# Patient Record
Sex: FEMALE | Race: WHITE | Employment: FULL TIME | URBAN - NONMETROPOLITAN AREA
[De-identification: names, ages, dates, MRNs, and addresses within clinical notes are randomized per-mention and may not be internally consistent; named-entity substitution may affect disease eponyms.]

---

## 2017-01-27 ENCOUNTER — TELEPHONE (OUTPATIENT)
Dept: OBGYN | Age: 47
End: 2017-01-27

## 2018-07-14 ENCOUNTER — OFFICE VISIT (OUTPATIENT)
Dept: RETAIL CLINIC | Facility: CLINIC | Age: 48
End: 2018-07-14

## 2018-07-14 VITALS
TEMPERATURE: 98.6 F | WEIGHT: 176.6 LBS | HEART RATE: 76 BPM | HEIGHT: 60 IN | BODY MASS INDEX: 34.67 KG/M2 | OXYGEN SATURATION: 97 % | DIASTOLIC BLOOD PRESSURE: 72 MMHG | SYSTOLIC BLOOD PRESSURE: 110 MMHG | RESPIRATION RATE: 20 BRPM

## 2018-07-14 DIAGNOSIS — J06.9 ACUTE UPPER RESPIRATORY INFECTION: Primary | ICD-10-CM

## 2018-07-14 DIAGNOSIS — R05.9 COUGH: ICD-10-CM

## 2018-07-14 PROCEDURE — 99203 OFFICE O/P NEW LOW 30 MIN: CPT | Performed by: ADVANCED PRACTICE MIDWIFE

## 2018-07-14 RX ORDER — BENZONATATE 100 MG/1
200 CAPSULE ORAL 3 TIMES DAILY PRN
Qty: 30 CAPSULE | Refills: 0 | Status: SHIPPED | OUTPATIENT
Start: 2018-07-14 | End: 2020-11-09

## 2018-07-14 RX ORDER — AMOXICILLIN 500 MG/1
500 CAPSULE ORAL 2 TIMES DAILY
Qty: 20 CAPSULE | Refills: 0 | Status: SHIPPED | OUTPATIENT
Start: 2018-07-14 | End: 2018-07-24

## 2018-07-14 RX ORDER — BUSPIRONE HYDROCHLORIDE 10 MG/1
10 TABLET ORAL 3 TIMES DAILY
COMMUNITY
End: 2020-11-09

## 2018-07-14 RX ORDER — BUPROPION HYDROCHLORIDE 150 MG/1
300 TABLET ORAL DAILY
COMMUNITY

## 2018-07-14 NOTE — PROGRESS NOTES
Chief Complaint   Patient presents with   • Cough     Subjective   Una Gonsalez is a 47 y.o. female who presents to the clinic today with complaints   Cough   This is a new problem. Episode onset: 10 days ago. The problem has been unchanged. The problem occurs every few minutes. The cough is productive of sputum. Associated symptoms include rhinorrhea and a sore throat. Pertinent negatives include no chest pain, chills, ear congestion, ear pain, fever, headaches, nasal congestion, shortness of breath or wheezing. The symptoms are aggravated by lying down. She has tried OTC cough suppressant for the symptoms. The treatment provided no relief.         Current Outpatient Prescriptions:   •  buPROPion XL (WELLBUTRIN XL) 150 MG 24 hr tablet, Take 300 mg by mouth Daily., Disp: , Rfl:   •  busPIRone (BUSPAR) 10 MG tablet, Take 10 mg by mouth 3 (Three) Times a Day., Disp: , Rfl:   •  amoxicillin (AMOXIL) 500 MG capsule, Take 1 capsule by mouth 2 (Two) Times a Day for 10 days., Disp: 20 capsule, Rfl: 0  •  benzonatate (TESSALON PERLES) 100 MG capsule, Take 2 capsules by mouth 3 (Three) Times a Day As Needed for Cough., Disp: 30 capsule, Rfl: 0    Allergies:  Patient has no known allergies.    Past Medical History:   Diagnosis Date   • Anxiety      Past Surgical History:   Procedure Laterality Date   •  SECTION  ,    • HYSTERECTOMY       History reviewed. No pertinent family history.  Social History   Substance Use Topics   • Smoking status: Never Smoker   • Smokeless tobacco: Not on file   • Alcohol use No       Review of Systems  Review of Systems   Constitutional: Positive for fatigue. Negative for chills and fever.   HENT: Positive for rhinorrhea and sore throat. Negative for ear discharge, ear pain, sinus pain, sinus pressure, sneezing, tinnitus and trouble swallowing.    Respiratory: Positive for cough. Negative for shortness of breath and wheezing.    Cardiovascular: Negative for chest pain.  "  Neurological: Negative for headaches.       Objective   /72 (BP Location: Left arm, Patient Position: Sitting, Cuff Size: Adult)   Pulse 76   Temp 98.6 °F (37 °C) (Oral)   Resp 20   Ht 152.4 cm (60\")   Wt 80.1 kg (176 lb 9.6 oz)   SpO2 97%   BMI 34.49 kg/m²       Physical Exam   Constitutional: She is oriented to person, place, and time. Vital signs are normal. She appears well-developed and well-nourished. She is cooperative. She appears ill. No distress.   HENT:   Head: Normocephalic.   Right Ear: Hearing, tympanic membrane and external ear normal.   Left Ear: Hearing, tympanic membrane and external ear normal.   Nose: Rhinorrhea present. No sinus tenderness.   Mouth/Throat: Uvula is midline and mucous membranes are normal. Posterior oropharyngeal erythema (cobblestoning present) present. No oropharyngeal exudate. Tonsils are 2+ on the right. Tonsils are 2+ on the left. No tonsillar exudate.   Bilateral ear canals erythematous. Bilateral nares/turbinated erythematous.   Eyes: Pupils are equal, round, and reactive to light.   Cardiovascular: Normal rate, regular rhythm and normal heart sounds.    No murmur heard.  Pulmonary/Chest: Effort normal and breath sounds normal. No respiratory distress. She has no wheezes.   Lymphadenopathy:        Head (right side): No submental, no submandibular, no tonsillar, no preauricular and no posterior auricular adenopathy present.        Head (left side): No submental, no submandibular, no tonsillar, no preauricular and no posterior auricular adenopathy present.     She has no cervical adenopathy.   Neurological: She is alert and oriented to person, place, and time.   Skin: Skin is warm and dry.   Psychiatric: She has a normal mood and affect. Her behavior is normal.       Assessment/Plan     Una was seen today for cough.    Diagnoses and all orders for this visit:    Acute upper respiratory infection  -     amoxicillin (AMOXIL) 500 MG capsule; Take 1 capsule by " mouth 2 (Two) Times a Day for 10 days.    Cough  -     benzonatate (TESSALON PERLES) 100 MG capsule; Take 2 capsules by mouth 3 (Three) Times a Day As Needed for Cough.            See patient education instructions. Declines AVS. Take abx until all gone. If no improvement after 48 hours on abx, f/u with PCP. Discontinue OTC cough suppressant while on Tessalon. Increase water intake and rest. Verbalized understanding and agrees with POC.

## 2018-07-14 NOTE — PATIENT INSTRUCTIONS
"Upper Respiratory Infection, Adult  Most upper respiratory infections (URIs) are a viral infection of the air passages leading to the lungs. A URI affects the nose, throat, and upper air passages. The most common type of URI is nasopharyngitis and is typically referred to as \"the common cold.\"  URIs run their course and usually go away on their own. Most of the time, a URI does not require medical attention, but sometimes a bacterial infection in the upper airways can follow a viral infection. This is called a secondary infection. Sinus and middle ear infections are common types of secondary upper respiratory infections.  Bacterial pneumonia can also complicate a URI. A URI can worsen asthma and chronic obstructive pulmonary disease (COPD). Sometimes, these complications can require emergency medical care and may be life threatening.  What are the causes?  Almost all URIs are caused by viruses. A virus is a type of germ and can spread from one person to another.  What increases the risk?  You may be at risk for a URI if:  · You smoke.  · You have chronic heart or lung disease.  · You have a weakened defense (immune) system.  · You are very young or very old.  · You have nasal allergies or asthma.  · You work in crowded or poorly ventilated areas.  · You work in health care facilities or schools.    What are the signs or symptoms?  Symptoms typically develop 2-3 days after you come in contact with a cold virus. Most viral URIs last 7-10 days. However, viral URIs from the influenza virus (flu virus) can last 14-18 days and are typically more severe. Symptoms may include:  · Runny or stuffy (congested) nose.  · Sneezing.  · Cough.  · Sore throat.  · Headache.  · Fatigue.  · Fever.  · Loss of appetite.  · Pain in your forehead, behind your eyes, and over your cheekbones (sinus pain).  · Muscle aches.    How is this diagnosed?  Your health care provider may diagnose a URI by:  · Physical exam.  · Tests to check that your " symptoms are not due to another condition such as:  ? Strep throat.  ? Sinusitis.  ? Pneumonia.  ? Asthma.    How is this treated?  A URI goes away on its own with time. It cannot be cured with medicines, but medicines may be prescribed or recommended to relieve symptoms. Medicines may help:  · Reduce your fever.  · Reduce your cough.  · Relieve nasal congestion.    Follow these instructions at home:  · Take medicines only as directed by your health care provider.  · Gargle warm saltwater or take cough drops to comfort your throat as directed by your health care provider.  · Use a warm mist humidifier or inhale steam from a shower to increase air moisture. This may make it easier to breathe.  · Drink enough fluid to keep your urine clear or pale yellow.  · Eat soups and other clear broths and maintain good nutrition.  · Rest as needed.  · Return to work when your temperature has returned to normal or as your health care provider advises. You may need to stay home longer to avoid infecting others. You can also use a face mask and careful hand washing to prevent spread of the virus.  · Increase the usage of your inhaler if you have asthma.  · Do not use any tobacco products, including cigarettes, chewing tobacco, or electronic cigarettes. If you need help quitting, ask your health care provider.  How is this prevented?  The best way to protect yourself from getting a cold is to practice good hygiene.  · Avoid oral or hand contact with people with cold symptoms.  · Wash your hands often if contact occurs.    There is no clear evidence that vitamin C, vitamin E, echinacea, or exercise reduces the chance of developing a cold. However, it is always recommended to get plenty of rest, exercise, and practice good nutrition.  Contact a health care provider if:  · You are getting worse rather than better.  · Your symptoms are not controlled by medicine.  · You have chills.  · You have worsening shortness of breath.  · You have  brown or red mucus.  · You have yellow or brown nasal discharge.  · You have pain in your face, especially when you bend forward.  · You have a fever.  · You have swollen neck glands.  · You have pain while swallowing.  · You have white areas in the back of your throat.  Get help right away if:  · You have severe or persistent:  ? Headache.  ? Ear pain.  ? Sinus pain.  ? Chest pain.  · You have chronic lung disease and any of the following:  ? Wheezing.  ? Prolonged cough.  ? Coughing up blood.  ? A change in your usual mucus.  · You have a stiff neck.  · You have changes in your:  ? Vision.  ? Hearing.  ? Thinking.  ? Mood.  This information is not intended to replace advice given to you by your health care provider. Make sure you discuss any questions you have with your health care provider.  Document Released: 06/13/2002 Document Revised: 08/20/2017 Document Reviewed: 03/25/2015  [employer] Interactive Patient Education © 2018 [employer]  Amoxicillin capsules or tablets  What is this medicine?  AMOXICILLIN (a mox i DIONE in) is a penicillin antibiotic. It is used to treat certain kinds of bacterial infections. It will not work for colds, flu, or other viral infections.  This medicine may be used for other purposes; ask your health care provider or pharmacist if you have questions.  COMMON BRAND NAME(S): Amoxil, Moxilin, Sumox, Trimox  What should I tell my health care provider before I take this medicine?  They need to know if you have any of these conditions:  -asthma  -kidney disease  -an unusual or allergic reaction to amoxicillin, other penicillins, cephalosporin antibiotics, other medicines, foods, dyes, or preservatives  -pregnant or trying to get pregnant  -breast-feeding  How should I use this medicine?  Take this medicine by mouth with a glass of water. Follow the directions on your prescription label. You may take this medicine with food or on an empty stomach. Take your medicine at regular intervals. Do  not take your medicine more often than directed. Take all of your medicine as directed even if you think your are better. Do not skip doses or stop your medicine early.  Talk to your pediatrician regarding the use of this medicine in children. While this drug may be prescribed for selected conditions, precautions do apply.  Overdosage: If you think you have taken too much of this medicine contact a poison control center or emergency room at once.  NOTE: This medicine is only for you. Do not share this medicine with others.  What if I miss a dose?  If you miss a dose, take it as soon as you can. If it is almost time for your next dose, take only that dose. Do not take double or extra doses.  What may interact with this medicine?  -amiloride  -birth control pills  -chloramphenicol  -macrolides  -probenecid  -sulfonamides  -tetracyclines  This list may not describe all possible interactions. Give your health care provider a list of all the medicines, herbs, non-prescription drugs, or dietary supplements you use. Also tell them if you smoke, drink alcohol, or use illegal drugs. Some items may interact with your medicine.  What should I watch for while using this medicine?  Tell your doctor or health care professional if your symptoms do not improve in 2 or 3 days. Take all of the doses of your medicine as directed. Do not skip doses or stop your medicine early.  If you are diabetic, you may get a false positive result for sugar in your urine with certain brands of urine tests. Check with your doctor.  Do not treat diarrhea with over-the-counter products. Contact your doctor if you have diarrhea that lasts more than 2 days or if the diarrhea is severe and watery.  What side effects may I notice from receiving this medicine?  Side effects that you should report to your doctor or health care professional as soon as possible:  -allergic reactions like skin rash, itching or hives, swelling of the face, lips, or  tongue  -breathing problems  -dark urine  -redness, blistering, peeling or loosening of the skin, including inside the mouth  -seizures  -severe or watery diarrhea  -trouble passing urine or change in the amount of urine  -unusual bleeding or bruising  -unusually weak or tired  -yellowing of the eyes or skin  Side effects that usually do not require medical attention (report to your doctor or health care professional if they continue or are bothersome):  -dizziness  -headache  -stomach upset  -trouble sleeping  This list may not describe all possible side effects. Call your doctor for medical advice about side effects. You may report side effects to FDA at 4-770-FDA-1974.  Where should I keep my medicine?  Keep out of the reach of children.  Store between 68 and 77 degrees F (20 and 25 degrees C). Keep bottle closed tightly. Throw away any unused medicine after the expiration date.  NOTE: This sheet is a summary. It may not cover all possible information. If you have questions about this medicine, talk to your doctor, pharmacist, or health care provider.  © 2018 Elsevier/[employer] Standard (2009-03-10 14:10:59)  Cough, Adult  Coughing is a reflex that clears your throat and your airways. Coughing helps to heal and protect your lungs. It is normal to cough occasionally, but a cough that happens with other symptoms or lasts a long time may be a sign of a condition that needs treatment. A cough may last only 2-3 weeks (acute), or it may last longer than 8 weeks (chronic).  What are the causes?  Coughing is commonly caused by:  · Breathing in substances that irritate your lungs.  · A viral or bacterial respiratory infection.  · Allergies.  · Asthma.  · Postnasal drip.  · Smoking.  · Acid backing up from the stomach into the esophagus (gastroesophageal reflux).  · Certain medicines.  · Chronic lung problems, including COPD (or rarely, lung cancer).  · Other medical conditions such as heart failure.    Follow these  instructions at home:  Pay attention to any changes in your symptoms. Take these actions to help with your discomfort:  · Take medicines only as told by your health care provider.  ? If you were prescribed an antibiotic medicine, take it as told by your health care provider. Do not stop taking the antibiotic even if you start to feel better.  ? Talk with your health care provider before you take a cough suppressant medicine.  · Drink enough fluid to keep your urine clear or pale yellow.  · If the air is dry, use a cold steam vaporizer or humidifier in your bedroom or your home to help loosen secretions.  · Avoid anything that causes you to cough at work or at home.  · If your cough is worse at night, try sleeping in a semi-upright position.  · Avoid cigarette smoke. If you smoke, quit smoking. If you need help quitting, ask your health care provider.  · Avoid caffeine.  · Avoid alcohol.  · Rest as needed.    Contact a health care provider if:  · You have new symptoms.  · You cough up pus.  · Your cough does not get better after 2-3 weeks, or your cough gets worse.  · You cannot control your cough with suppressant medicines and you are losing sleep.  · You develop pain that is getting worse or pain that is not controlled with pain medicines.  · You have a fever.  · You have unexplained weight loss.  · You have night sweats.  Get help right away if:  · You cough up blood.  · You have difficulty breathing.  · Your heartbeat is very fast.  This information is not intended to replace advice given to you by your health care provider. Make sure you discuss any questions you have with your health care provider.  Document Released: 06/15/2012 Document Revised: 05/25/2017 Document Reviewed: 02/24/2016  [employer] Interactive Patient Education © 2018 [employer]  Benzonatate capsules  What is this medicine?  BENZONATATE (juhi SARINA na wilks) is used to treat cough.  This medicine may be used for other purposes; ask your health  care provider or pharmacist if you have questions.  COMMON BRAND NAME(S): Maricel Curry  What should I tell my health care provider before I take this medicine?  They need to know if you have any of these conditions:  -kidney or liver disease  -an unusual or allergic reaction to benzonatate, anesthetics, other medicines, foods, dyes, or preservatives  -pregnant or trying to get pregnant  -breast-feeding  How should I use this medicine?  Take this medicine by mouth with a glass of water. Follow the directions on the prescription label. Avoid breaking, chewing, or sucking the capsule, as this can cause serious side effects. Take your medicine at regular intervals. Do not take your medicine more often than directed.  Talk to your pediatrician regarding the use of this medicine in children. While this drug may be prescribed for children as young as 10 years old for selected conditions, precautions do apply.  Overdosage: If you think you have taken too much of this medicine contact a poison control center or emergency room at once.  NOTE: This medicine is only for you. Do not share this medicine with others.  What if I miss a dose?  If you miss a dose, take it as soon as you can. If it is almost time for your next dose, take only that dose. Do not take double or extra doses.  What may interact with this medicine?  Do not take this medicine with any of the following medications:  -MAOIs like Carbex, Eldepryl, Marplan, Nardil, and Parnate  This list may not describe all possible interactions. Give your health care provider a list of all the medicines, herbs, non-prescription drugs, or dietary supplements you use. Also tell them if you smoke, drink alcohol, or use illegal drugs. Some items may interact with your medicine.  What should I watch for while using this medicine?  Tell your doctor if your symptoms do not improve or if they get worse. If you have a high fever, skin rash, or headache, see your health  care professional.  You may get drowsy or dizzy. Do not drive, use machinery, or do anything that needs mental alertness until you know how this medicine affects you. Do not sit or stand up quickly, especially if you are an older patient. This reduces the risk of dizzy or fainting spells.  What side effects may I notice from receiving this medicine?  Side effects that you should report to your doctor or health care professional as soon as possible:  -allergic reactions like skin rash, itching or hives, swelling of the face, lips, or tongue  -breathing problems  -chest pain  -confusion or hallucinations  -irregular heartbeat  -numbness of mouth or throat  -seizures  Side effects that usually do not require medical attention (report to your doctor or health care professional if they continue or are bothersome):  -burning feeling in the eyes  -constipation  -headache  -nasal congestion  -stomach upset  This list may not describe all possible side effects. Call your doctor for medical advice about side effects. You may report side effects to FDA at 8-666-HIO-8230.  Where should I keep my medicine?  Keep out of the reach of children.  Store at room temperature between 15 and 30 degrees C (59 and 86 degrees F). Keep tightly closed. Protect from light and moisture. Throw away any unused medicine after the expiration date.  NOTE: This sheet is a summary. It may not cover all possible information. If you have questions about this medicine, talk to your doctor, pharmacist, or health care provider.  © 2018 [employer]vier/[employer] Standard (2009-03-18 14:52:56)

## 2018-09-20 ENCOUNTER — TELEPHONE (OUTPATIENT)
Dept: GASTROENTEROLOGY | Facility: CLINIC | Age: 48
End: 2018-09-20

## 2018-09-20 NOTE — TELEPHONE ENCOUNTER
Pt records from physician reviewed  According to medical record Una Gonsalez does not have a history of heart disease or lung disease.  According to medical record Una Gonsalez is not currently on blood thinner.  According to medical record Una Gonsalez is not currently exhibiting abdominal pain, weight loss, hematochezia, melena,  change in bowels, or other symptoms to indicate pt would need to be seen in office.    Will submit order for Una Gonsalez to proceed with [facility]    Medication will be verified as well as a lack of GI related symptomology when pt is scheduled for procedure.

## 2018-10-04 ENCOUNTER — TELEPHONE (OUTPATIENT)
Dept: GASTROENTEROLOGY | Facility: CLINIC | Age: 48
End: 2018-10-04

## 2018-10-24 ENCOUNTER — TELEPHONE (OUTPATIENT)
Dept: GASTROENTEROLOGY | Facility: CLINIC | Age: 48
End: 2018-10-24

## 2018-11-08 ENCOUNTER — TELEPHONE (OUTPATIENT)
Dept: GASTROENTEROLOGY | Facility: CLINIC | Age: 48
End: 2018-11-08

## 2019-12-19 ENCOUNTER — LAB (OUTPATIENT)
Dept: LAB | Facility: HOSPITAL | Age: 49
End: 2019-12-19

## 2019-12-19 ENCOUNTER — TRANSCRIBE ORDERS (OUTPATIENT)
Dept: ADMINISTRATIVE | Facility: HOSPITAL | Age: 49
End: 2019-12-19

## 2019-12-19 DIAGNOSIS — R30.9 MICTURITION PAINFUL: ICD-10-CM

## 2019-12-19 DIAGNOSIS — R30.9 MICTURITION PAINFUL: Primary | ICD-10-CM

## 2019-12-19 LAB
BACTERIA UR QL AUTO: ABNORMAL /HPF
BILIRUB UR QL STRIP: NEGATIVE
CLARITY UR: CLEAR
COLOR UR: YELLOW
GLUCOSE UR STRIP-MCNC: NEGATIVE MG/DL
HGB UR QL STRIP.AUTO: NEGATIVE
HYALINE CASTS UR QL AUTO: ABNORMAL /LPF
KETONES UR QL STRIP: NEGATIVE
LEUKOCYTE ESTERASE UR QL STRIP.AUTO: ABNORMAL
NITRITE UR QL STRIP: NEGATIVE
PH UR STRIP.AUTO: 6.5 [PH] (ref 5–8)
PROT UR QL STRIP: NEGATIVE
RBC # UR: ABNORMAL /HPF
REF LAB TEST METHOD: ABNORMAL
SP GR UR STRIP: 1.01 (ref 1–1.03)
SQUAMOUS #/AREA URNS HPF: ABNORMAL /HPF
UROBILINOGEN UR QL STRIP: ABNORMAL
WBC UR QL AUTO: ABNORMAL /HPF

## 2019-12-19 PROCEDURE — 87086 URINE CULTURE/COLONY COUNT: CPT | Performed by: PEDIATRICS

## 2019-12-19 PROCEDURE — 81001 URINALYSIS AUTO W/SCOPE: CPT

## 2019-12-22 LAB — BACTERIA SPEC AEROBE CULT: ABNORMAL

## 2020-01-15 ENCOUNTER — TRANSCRIBE ORDERS (OUTPATIENT)
Dept: ADMINISTRATIVE | Facility: HOSPITAL | Age: 50
End: 2020-01-15

## 2020-01-15 ENCOUNTER — APPOINTMENT (OUTPATIENT)
Dept: LAB | Facility: HOSPITAL | Age: 50
End: 2020-01-15

## 2020-01-15 DIAGNOSIS — N39.0 URINARY TRACT INFECTION WITHOUT HEMATURIA, SITE UNSPECIFIED: Primary | ICD-10-CM

## 2020-01-15 LAB
BILIRUB UR QL STRIP: NEGATIVE
CLARITY UR: CLEAR
COLOR UR: YELLOW
GLUCOSE UR STRIP-MCNC: NEGATIVE MG/DL
HGB UR QL STRIP.AUTO: NEGATIVE
KETONES UR QL STRIP: ABNORMAL
LEUKOCYTE ESTERASE UR QL STRIP.AUTO: NEGATIVE
NITRITE UR QL STRIP: NEGATIVE
PH UR STRIP.AUTO: 6 [PH] (ref 5–8)
PROT UR QL STRIP: NEGATIVE
SP GR UR STRIP: 1.01 (ref 1–1.03)
UROBILINOGEN UR QL STRIP: ABNORMAL

## 2020-01-15 PROCEDURE — 81003 URINALYSIS AUTO W/O SCOPE: CPT | Performed by: PEDIATRICS

## 2020-02-25 ENCOUNTER — APPOINTMENT (OUTPATIENT)
Dept: LAB | Facility: HOSPITAL | Age: 50
End: 2020-02-25

## 2020-02-25 ENCOUNTER — TRANSCRIBE ORDERS (OUTPATIENT)
Dept: ADMINISTRATIVE | Facility: HOSPITAL | Age: 50
End: 2020-02-25

## 2020-02-25 DIAGNOSIS — R30.9 PAINFUL MICTURITION, UNSPECIFIED: Primary | ICD-10-CM

## 2020-02-25 LAB
BACTERIA UR QL AUTO: ABNORMAL /HPF
BILIRUB UR QL STRIP: NEGATIVE
CLARITY UR: CLEAR
COLOR UR: YELLOW
GLUCOSE UR STRIP-MCNC: NEGATIVE MG/DL
HGB UR QL STRIP.AUTO: NEGATIVE
HYALINE CASTS UR QL AUTO: ABNORMAL /LPF
KETONES UR QL STRIP: ABNORMAL
LEUKOCYTE ESTERASE UR QL STRIP.AUTO: ABNORMAL
NITRITE UR QL STRIP: NEGATIVE
PH UR STRIP.AUTO: 6 [PH] (ref 5–8)
PROT UR QL STRIP: NEGATIVE
RBC # UR: ABNORMAL /HPF
REF LAB TEST METHOD: ABNORMAL
SP GR UR STRIP: 1.02 (ref 1–1.03)
SQUAMOUS #/AREA URNS HPF: ABNORMAL /HPF
UROBILINOGEN UR QL STRIP: ABNORMAL
WBC UR QL AUTO: ABNORMAL /HPF

## 2020-02-25 PROCEDURE — 87086 URINE CULTURE/COLONY COUNT: CPT | Performed by: PEDIATRICS

## 2020-02-25 PROCEDURE — 81001 URINALYSIS AUTO W/SCOPE: CPT | Performed by: PEDIATRICS

## 2020-02-26 LAB — BACTERIA SPEC AEROBE CULT: NORMAL

## 2020-11-09 ENCOUNTER — TELEMEDICINE (OUTPATIENT)
Dept: GASTROENTEROLOGY | Facility: CLINIC | Age: 50
End: 2020-11-09

## 2020-11-09 VITALS — HEIGHT: 60 IN | WEIGHT: 169 LBS | BODY MASS INDEX: 33.18 KG/M2

## 2020-11-09 DIAGNOSIS — Z12.11 ENCOUNTER FOR SCREENING FOR MALIGNANT NEOPLASM OF COLON: Primary | ICD-10-CM

## 2020-11-09 PROCEDURE — S0285 CNSLT BEFORE SCREEN COLONOSC: HCPCS | Performed by: CLINICAL NURSE SPECIALIST

## 2020-11-09 RX ORDER — SODIUM, POTASSIUM,MAG SULFATES 17.5-3.13G
SOLUTION, RECONSTITUTED, ORAL ORAL
Qty: 2 BOTTLE | Refills: 0 | Status: ON HOLD | OUTPATIENT
Start: 2020-11-09 | End: 2021-01-11

## 2020-11-09 RX ORDER — PHENTERMINE HYDROCHLORIDE 37.5 MG/1
37.5 CAPSULE ORAL EVERY MORNING
COMMUNITY

## 2020-11-09 NOTE — PROGRESS NOTES
Una Gonsalez  1970      2020  Chief Complaint   Patient presents with   • Colonoscopy     New patient ref by Dr. PRICE Sanchez     Subjective   HPI  Una Gonsalez is a 50 y.o. female who presents as a referral for preventative maintenance. She has no complaints of nausea or vomiting. No change in bowels. No wt loss. No BRBPR. No melena. There is no family hx for colon cancer. No abdominal pain.  Past Medical History:   Diagnosis Date   • Anxiety    • Obesity      Past Surgical History:   Procedure Laterality Date   •  SECTION  ,    • EYE SURGERY     • HYSTERECTOMY       Outpatient Medications Marked as Taking for the 20 encounter (Telemedicine) with Carmen Epperson APRN   Medication Sig Dispense Refill   • buPROPion XL (WELLBUTRIN XL) 150 MG 24 hr tablet Take 300 mg by mouth Daily.     • phentermine 37.5 MG capsule Take 37.5 mg by mouth Every Morning.       No Known Allergies  Social History     Socioeconomic History   • Marital status:      Spouse name: Not on file   • Number of children: Not on file   • Years of education: Not on file   • Highest education level: Not on file   Tobacco Use   • Smoking status: Never Smoker   • Smokeless tobacco: Never Used   Substance and Sexual Activity   • Alcohol use: No   • Drug use: No     Family History   Problem Relation Age of Onset   • Colon cancer Neg Hx    • Colon polyps Neg Hx      Health Maintenance   Topic Date Due   • COLONOSCOPY  1970   • ANNUAL PHYSICAL  08/15/1971   • TDAP/TD VACCINES (1 - Tdap) 08/15/1987   • HEPATITIS C SCREENING  2018   • PAP SMEAR  2018   • ZOSTER VACCINE (1 of 2) 08/15/2018   • MAMMOGRAM  2019   • INFLUENZA VACCINE  2020   • Pneumococcal Vaccine 0-64  Aged Out       REVIEW OF SYSTEMS  General: well appearing, no fever chills or sweats, no unexplained wt loss  HEENT: no acute visual or hearing disturbances  Cardiovascular: No chest pain or palpitations  Pulmonary: No  "shortness of breath, coughing, wheezing or hemoptysis  : No burning, urgency, hematuria, or dysuria  Musculoskeletal: No joint pain or stiffness  Peripheral: no edema  Skin: No lesions or rashes  Neuro: No dizziness, headaches, stroke, syncope  Endocrine: No hot or cold intolerances  Hematological: No blood dyscrasias    Objective   Vitals:    11/09/20 1339   Weight: 76.7 kg (169 lb)   Height: 152.4 cm (60\")     Body mass index is 33.01 kg/m².  Patient's Body mass index is 33.01 kg/m². BMI is above normal parameters. Recommendations include: nutrition counseling.      PHYSICAL EXAM  General: age appropriate well nourished well appearing, no acute distress  Head: normocephalic and atraumatic  Global assessment-supple  Neck-No JVD noted  Neuro-Non focal, converses appropriately, awake, alert, oriented    Assessment/Plan     Diagnoses and all orders for this visit:    1. Encounter for screening for malignant neoplasm of colon (Primary)  -     Case Request; Standing  -     Implement Anesthesia Orders Day of Procedure; Standing  -     Obtain Informed Consent; Standing  -     Verify bowel prep was successful; Standing  -     Suprep Bowel Prep Kit 17.5-3.13-1.6 GM/177ML solution oral solution; Take as directed by office instructions provided  Dispense: 2 bottle; Refill: 0  -     Case Request        COLONOSCOPY WITH ANESTHESIA (N/A)  Body mass index is 33.01 kg/m².    Patient instructions on prep prior to procedure provided to the patient.    All risks, benefits, alternatives, and indications of colonoscopy procedure have been discussed with the patient. Risks to include perforation of the colon requiring possible surgery or colostomy, risk of bleeding from biopsies or removal of colon tissue, possibility of missing a colon polyp or cancer, or adverse drug reaction.  Benefits to include the diagnosis and management of disease of the colon and rectum. Alternatives to include barium enema, radiographic evaluation, lab " testing or no intervention. Pt verbalizes understanding and agrees.     Carmen Debi Epperson, ABDIRIZAK  2020  13:50 CST      IF YOU SMOKE OR USE TOBACCO PLEASE READ THE FOLLOWIN minutes reading provided    Why is smoking bad for me?  Smoking increases the risk of heart disease, lung disease, vascular disease, stroke, and cancer.     If you smoke, STOP!    If you would like more information on quitting smoking, please visit the [employer] website: [email or web address]   This link will provide additional resources including the QUIT line and the Beat the Pack support groups.     For more information:    Quit Now [place]  -QUIT-NOW  [email or web address]    Obesity, Adult  Obesity is the condition of having too much total body fat. Being overweight or obese means that your weight is greater than what is considered healthy for your body size. Obesity is determined by a measurement called BMI. BMI is an estimate of body fat and is calculated from height and weight. For adults, a BMI of 30 or higher is considered obese.  Obesity can eventually lead to other health concerns and major illnesses, including:  · Stroke.  · Coronary artery disease (CAD).  · Type 2 diabetes.  · Some types of cancer, including cancers of the colon, breast, uterus, and gallbladder.  · Osteoarthritis.  · High blood pressure (hypertension).  · High cholesterol.  · Sleep apnea.  · Gallbladder stones.  · Infertility problems.  What are the causes?  The main cause of obesity is taking in (consuming) more calories than your body uses for energy. Other factors that contribute to this condition may include:  · Being born with genes that make you more likely to become obese.  · Having a medical condition that causes obesity. These conditions include:  ¨ Hypothyroidism.  ¨ Polycystic ovarian syndrome (PCOS).  ¨ Binge-eating disorder.  ¨ Cushing syndrome.  · Taking certain medicines,  such as steroids, antidepressants, and seizure medicines.  · Not being physically active (sedentary lifestyle).  · Living where there are limited places to exercise safely or buy healthy foods.  · Not getting enough sleep.  What increases the risk?  The following factors may increase your risk of this condition:  · Having a family history of obesity.  · Being a woman of -American descent.  · Being a man of  descent.  What are the signs or symptoms?  Having excessive body fat is the main symptom of this condition.  How is this diagnosed?  This condition may be diagnosed based on:  · Your symptoms.  · Your medical history.  · A physical exam. Your health care provider may measure:  ¨ Your BMI. If you are an adult with a BMI between 25 and less than 30, you are considered overweight. If you are an adult with a BMI of 30 or higher, you are considered obese.  ¨ The distances around your hips and your waist (circumferences). These may be compared to each other to help diagnose your condition.  ¨ Your skinfold thickness. Your health care provider may gently pinch a fold of your skin and measure it.  How is this treated?  Treatment for this condition often includes changing your lifestyle. Treatment may include some or all of the following:  · Dietary changes. Work with your health care provider and a dietitian to set a weight-loss goal that is healthy and reasonable for you. Dietary changes may include eating:  ¨ Smaller portions. A portion size is the amount of a particular food that is healthy for you to eat at one time. This varies from person to person.  ¨ Low-calorie or low-fat options.  ¨ More whole grains, fruits, and vegetables.  · Regular physical activity. This may include aerobic activity (cardio) and strength training.  · Medicine to help you lose weight. Your health care provider may prescribe medicine if you are unable to lose 1 pound a week after 6 weeks of eating more healthily and doing more  physical activity.  · Surgery. Surgical options may include gastric banding and gastric bypass. Surgery may be done if:  ¨ Other treatments have not helped to improve your condition.  ¨ You have a BMI of 40 or higher.  ¨ You have life-threatening health problems related to obesity.  Follow these instructions at home:     Eating and drinking     · Follow recommendations from your health care provider about what you eat and drink. Your health care provider may advise you to:  ¨ Limit fast foods, sweets, and processed snack foods.  ¨ Choose low-fat options, such as low-fat milk instead of whole milk.  ¨ Eat 5 or more servings of fruits or vegetables every day.  ¨ Eat at home more often. This gives you more control over what you eat.  ¨ Choose healthy foods when you eat out.  ¨ Learn what a healthy portion size is.  ¨ Keep low-fat snacks on hand.  ¨ Avoid sugary drinks, such as soda, fruit juice, iced tea sweetened with sugar, and flavored milk.  ¨ Eat a healthy breakfast.  · Drink enough water to keep your urine clear or pale yellow.  · Do not go without eating for long periods of time (do not fast) or follow a fad diet. Fasting and fad diets can be unhealthy and even dangerous.  Physical Activity   · Exercise regularly, as told by your health care provider. Ask your health care provider what types of exercise are safe for you and how often you should exercise.  · Warm up and stretch before being active.  · Cool down and stretch after being active.  · Rest between periods of activity.  Lifestyle   · Limit the time that you spend in front of your TV, computer, or video game system.  · Find ways to reward yourself that do not involve food.  · Limit alcohol intake to no more than 1 drink a day for nonpregnant women and 2 drinks a day for men. One drink equals 12 oz of beer, 5 oz of wine, or 1½ oz of hard liquor.  General instructions   · Keep a weight loss journal to keep track of the food you eat and how much you  exercise you get.  · Take over-the-counter and prescription medicines only as told by your health care provider.  · Take vitamins and supplements only as told by your health care provider.  · Consider joining a support group. Your health care provider may be able to recommend a support group.  · Keep all follow-up visits as told by your health care provider. This is important.  Contact a health care provider if:  · You are unable to meet your weight loss goal after 6 weeks of dietary and lifestyle changes.  This information is not intended to replace advice given to you by your health care provider. Make sure you discuss any questions you have with your health care provider.  Document Released: 01/25/2006 Document Revised: 05/22/2017 Document Reviewed: 10/05/2016  [employer] Patient Education © 2017 [employer]

## 2020-11-10 PROBLEM — Z12.11 ENCOUNTER FOR SCREENING FOR MALIGNANT NEOPLASM OF COLON: Status: ACTIVE | Noted: 2020-11-10

## 2020-12-31 ENCOUNTER — TRANSCRIBE ORDERS (OUTPATIENT)
Dept: LAB | Facility: HOSPITAL | Age: 50
End: 2020-12-31

## 2020-12-31 DIAGNOSIS — Z01.818 PRE-OP TESTING: Primary | ICD-10-CM

## 2021-01-08 ENCOUNTER — LAB (OUTPATIENT)
Dept: LAB | Facility: HOSPITAL | Age: 51
End: 2021-01-08

## 2021-01-08 PROCEDURE — C9803 HOPD COVID-19 SPEC COLLECT: HCPCS | Performed by: INTERNAL MEDICINE

## 2021-01-08 PROCEDURE — U0004 COV-19 TEST NON-CDC HGH THRU: HCPCS | Performed by: INTERNAL MEDICINE

## 2021-01-09 LAB — SARS-COV-2 ORF1AB RESP QL NAA+PROBE: NOT DETECTED

## 2021-01-11 ENCOUNTER — HOSPITAL ENCOUNTER (OUTPATIENT)
Facility: HOSPITAL | Age: 51
Setting detail: HOSPITAL OUTPATIENT SURGERY
Discharge: HOME | End: 2021-01-11
Attending: INTERNAL MEDICINE | Admitting: INTERNAL MEDICINE

## 2021-01-11 ENCOUNTER — ANESTHESIA (OUTPATIENT)
Dept: GASTROENTEROLOGY | Facility: HOSPITAL | Age: 51
End: 2021-01-11

## 2021-01-11 ENCOUNTER — ANESTHESIA EVENT (OUTPATIENT)
Dept: GASTROENTEROLOGY | Facility: HOSPITAL | Age: 51
End: 2021-01-11

## 2021-01-11 VITALS
SYSTOLIC BLOOD PRESSURE: 121 MMHG | HEIGHT: 60 IN | DIASTOLIC BLOOD PRESSURE: 54 MMHG | RESPIRATION RATE: 16 BRPM | TEMPERATURE: 97.1 F | BODY MASS INDEX: 32.39 KG/M2 | OXYGEN SATURATION: 95 % | WEIGHT: 165 LBS | HEART RATE: 64 BPM

## 2021-01-11 DIAGNOSIS — Z12.11 ENCOUNTER FOR SCREENING FOR MALIGNANT NEOPLASM OF COLON: ICD-10-CM

## 2021-01-11 PROCEDURE — 45378 DIAGNOSTIC COLONOSCOPY: CPT | Performed by: INTERNAL MEDICINE

## 2021-01-11 PROCEDURE — 25010000002 PROPOFOL 10 MG/ML EMULSION: Performed by: NURSE ANESTHETIST, CERTIFIED REGISTERED

## 2021-01-11 RX ORDER — SODIUM CHLORIDE 0.9 % (FLUSH) 0.9 %
10 SYRINGE (ML) INJECTION AS NEEDED
Status: DISCONTINUED | OUTPATIENT
Start: 2021-01-11 | End: 2021-01-11 | Stop reason: HOSPADM

## 2021-01-11 RX ORDER — SODIUM CHLORIDE 0.9 % (FLUSH) 0.9 %
10 SYRINGE (ML) INJECTION AS NEEDED
Status: CANCELLED | OUTPATIENT
Start: 2021-01-11

## 2021-01-11 RX ORDER — LIDOCAINE HYDROCHLORIDE 10 MG/ML
0.5 INJECTION, SOLUTION EPIDURAL; INFILTRATION; INTRACAUDAL; PERINEURAL ONCE AS NEEDED
Status: DISCONTINUED | OUTPATIENT
Start: 2021-01-11 | End: 2021-01-11 | Stop reason: HOSPADM

## 2021-01-11 RX ORDER — SODIUM CHLORIDE 9 MG/ML
100 INJECTION, SOLUTION INTRAVENOUS CONTINUOUS
Status: CANCELLED | OUTPATIENT
Start: 2021-01-11

## 2021-01-11 RX ORDER — SODIUM CHLORIDE 9 MG/ML
500 INJECTION, SOLUTION INTRAVENOUS CONTINUOUS PRN
Status: DISCONTINUED | OUTPATIENT
Start: 2021-01-11 | End: 2021-01-11 | Stop reason: HOSPADM

## 2021-01-11 RX ORDER — PROPOFOL 10 MG/ML
VIAL (ML) INTRAVENOUS AS NEEDED
Status: DISCONTINUED | OUTPATIENT
Start: 2021-01-11 | End: 2021-01-11 | Stop reason: SURG

## 2021-01-11 RX ORDER — SODIUM CHLORIDE 0.9 % (FLUSH) 0.9 %
10 SYRINGE (ML) INJECTION EVERY 12 HOURS SCHEDULED
Status: CANCELLED | OUTPATIENT
Start: 2021-01-11

## 2021-01-11 RX ADMIN — SODIUM CHLORIDE 500 ML: 9 INJECTION, SOLUTION INTRAVENOUS at 07:23

## 2021-01-11 RX ADMIN — PROPOFOL 50 MG: 10 INJECTION, EMULSION INTRAVENOUS at 08:30

## 2021-01-11 RX ADMIN — PROPOFOL 300 MG: 10 INJECTION, EMULSION INTRAVENOUS at 08:16

## 2021-01-11 NOTE — ANESTHESIA POSTPROCEDURE EVALUATION
Patient: Una Gonsalez    Procedure Summary     Date: 01/11/21 Room / Location: [facility] ENDOSCOPY 4 / [facility]    Anesthesia Start: 0814 Anesthesia Stop: 0837    Procedure: COLONOSCOPY WITH ANESTHESIA (N/A ) Diagnosis:       Encounter for screening for malignant neoplasm of colon      (Encounter for screening for malignant neoplasm of colon [Z12.11])    Surgeon: Kashif Tarango MD Provider: Anyi Alvarez CRNA    Anesthesia Type: MAC ASA Status: 2          Anesthesia Type: MAC    Vitals  Vitals Value Taken Time   /54 01/11/21 0850   Temp     Pulse 64 01/11/21 0850   Resp 16 01/11/21 0850   SpO2 95 % 01/11/21 0850           Post Anesthesia Care and Evaluation    Patient location during evaluation: PHASE II  Patient participation: complete - patient participated  Level of consciousness: awake  Pain management: adequate  Airway patency: patent  Anesthetic complications: No anesthetic complications  PONV Status: none  Cardiovascular status: acceptable  Respiratory status: acceptable  Hydration status: acceptable

## 2021-01-11 NOTE — H&P
"[facility]  Pre Procedure History & Physical    Chief Complaint:   Screening    Subjective     HPI:   Here for screening colonoscopy.  No previous exam.  No symptoms.    Past Medical History:   Past Medical History:   Diagnosis Date   • Anxiety    • Obesity        Past Surgical History:  Past Surgical History:   Procedure Laterality Date   •  SECTION  ,    • EYE SURGERY     • HYSTERECTOMY         Family History:  Family History   Problem Relation Age of Onset   • Colon cancer Neg Hx    • Colon polyps Neg Hx        Social History:   reports that she has never smoked. She has never used smokeless tobacco. She reports that she does not drink alcohol or use drugs.    Medications:   Prior to Admission medications    Medication Sig Start Date End Date Taking? Authorizing Provider   buPROPion XL (WELLBUTRIN XL) 150 MG 24 hr tablet Take 300 mg by mouth Daily.   Yes Provider, MD Sumanth   ELDERBERRY PO Take  by mouth.   Yes Provider, MD Sumanth   phentermine 37.5 MG capsule Take 37.5 mg by mouth Every Morning.    Provider, MD Sumanth   Suprep Bowel Prep Kit 17.5-3.13-1.6 GM/177ML solution oral solution Take as directed by office instructions provided 20  Carmen Epperson APRN       Allergies:  Patient has no known allergies.    Objective     Blood pressure 126/76, pulse 76, temperature 97.1 °F (36.2 °C), temperature source Temporal, resp. rate 16, height 152.4 cm (60\"), weight 74.8 kg (165 lb), SpO2 100 %, not currently breastfeeding.    Physical Exam   Constitutional: Pt is oriented to person, place, and in no distress.   HENT: Mouth/Throat: Oropharynx is clear.   Cardiovascular: Normal rate, regular rhythm.    Pulmonary/Chest: Effort normal. No respiratory distress. No  wheezes.   Abdominal: Soft. Non-distended.  Skin: Skin is warm and dry.   Psychiatric: Mood, memory, affect and judgment appear normal.     Assessment/Plan     Diagnosis:  Screening " colonoscopy    Anticipated Surgical Procedure:    Proceed with colonoscopy as scheduled    The following major R/B/A were discussed with the patient, however the list is not all inclusive . Risk:  Bleeding (immediate and delayed), perforation (rupture or tear), reaction to medication, missed lesion/cancer, pain during the procedure, infection, need for surgery, need for ostomy, need for mechanical ventilation (breathing machine), death.  Benefits: removal of polyp/tissue, burn/clip/or inject to stop bleeding, removal of foreign body, dilate any stricture.  Alternatives: Xray or CT, surgery, do nothing with associated risk   The patient was given time to ask question and received explanation, and agrees to proceed as per History and Physical.   No guarantee given or expressed.    EMR Dragon/transcription disclaimer: Much of this encounter note is an electronic transcription/translation of spoken language to printed text.  The electronic translation of spoken language may permit erroneous, or at times, nonsensical words or phrases to be inadvertently transcribed.  Although I have reviewed the note for such errors, some may still exist.    Kashif Tarango MD  08:15 CST  1/11/2021

## 2021-01-11 NOTE — ANESTHESIA PREPROCEDURE EVALUATION
Anesthesia Evaluation     no history of anesthetic complications:  NPO Solid Status: > 8 hours  NPO Liquid Status: > 4 hours           Airway   Mallampati: II  TM distance: >3 FB  Neck ROM: full  Dental - normal exam     Pulmonary - negative pulmonary ROS   Cardiovascular - normal exam  Exercise tolerance: excellent (>7 METS)        Neuro/Psych  (+) psychiatric history Anxiety,     GI/Hepatic/Renal/Endo    (+) obesity,       Musculoskeletal (-) negative ROS    Abdominal    Substance History - negative use     OB/GYN          Other                        Anesthesia Plan    ASA 2     MAC     intravenous induction     Anesthetic plan, all risks, benefits, and alternatives have been provided, discussed and informed consent has been obtained with: patient.

## 2021-01-25 ENCOUNTER — TELEPHONE (OUTPATIENT)
Dept: GASTROENTEROLOGY | Facility: CLINIC | Age: 51
End: 2021-01-25

## 2022-11-09 ENCOUNTER — E-VISIT (OUTPATIENT)
Dept: FAMILY MEDICINE CLINIC | Facility: TELEHEALTH | Age: 52
End: 2022-11-09
Payer: COMMERCIAL

## 2022-11-09 PROCEDURE — BRIGHTMDVISIT: Performed by: NURSE PRACTITIONER

## 2022-11-09 NOTE — EXTERNAL PATIENT INSTRUCTIONS
Diagnosis   Urinary tract infection (UTI)   My name is Leah Ford. I'm a healthcare provider at [facility]. After reviewing your interview, I see you have a urinary tract infection (UTI).   Medications   Your pharmacy   [facility]/pharmacy #[id number] [place] [id number] [phone]     Prescription   Phenazopyridine (200mg): Take 1 tablet by mouth three times a day as needed for 2 days for pain or discomfort associated with your condition.   Nitrofurantoin monohydrate/macrocrystalline (100mg): Take 1 capsule by mouth every 12 hours for 5 days for infection. This medication is an antibiotic. Take it exactly as directed. You must finish the entire course of medication, even if you feel better after taking the first few doses.    I've given you a prescription dose of phenazopyridine. If it's more affordable or convenient, you may use the equivalent amount of non-prescription phenazopyridine. For example, instead of taking one 200 mg phenazopyridine tablet, you may take two 95 mg phenazopyridine tablets.   About your diagnosis   A UTI is an infection of one or more parts of the urinary tract, most commonly the bladder.   Most UTIs are caused by bacteria (usually E. coli) that travel up the urethra and into the bladder. I see that you have some common signs and symptoms of a UTI:    Pain or burning while urinating    Frequent urination    Sudden urge to urinate    Symptoms that feel a lot like past UTIs    Mild or moderate pain, pressure, or discomfort in your lower abdomen    Cloudy urine    Strange or strong smelling urine    Symptoms that began shortly after sexual intercourse   Fortunately, most UTIs aren't serious, and they're easily treated with antibiotics. Make sure you take all of the antibiotic pills given to you, even if you start to feel better after the first few doses. Otherwise, the UTI might come back.   What to expect   If you follow this treatment plan, you should start to feel better  within 1 to 2 days.   When to seek care   Call us at [phone]   with any sudden or unexpected symptoms.    Symptoms that don't improve or get worse in the next 48 hours    Fever that goes above 101F or lasts longer than 24 hours    Shaking or chills    Nausea or vomiting    Severe flank pain (pain in your back or side)   Other treatment    Rest and drink plenty of water    Urinate frequently and when you first feel the urge    Place a heating pad on your back or stomach to help relieve some of the discomfort   Prevention    Drink a lot of liquids to help flush bacteria from your system. Water is best. Try for six to eight, 8-ounce glasses a day on a regular basis.    Urinate often and when you first feel the urge. Bacteria can grow when urine stays in the bladder too long. Urinate after sex to flush away bacteria.    After using the toilet, always wipe from front to back. This step is most important after a bowel movement. Wiping from front to back prevents bacteria normally found in stool from entering the urinary tract.   Your provider   Your diagnosis was provided by Leah Ford, a member of your trusted care team at [facility].   If you have any questions, call us at [phone]  .

## 2022-11-09 NOTE — E-VISIT TREATED
Chief Complaint: Bladder infection (UTI)   Patient introduction   Patient is 52-year-old female.   Patient has had dysuria, frequent urination, and urinary urgency for 1 to 3 days.   Urine is cloudy with a strong or pungent odor.   Patient did not request an excuse note.   General presentation   Patient has not had a fever. No nausea or vomiting.   Moderate abdominal or pelvic pain.   No back pain.   No flank pain. Difficulty starting, stopping, or delaying urination.   The following treatments were helpful for current symptoms:    Phenazopyridine   The following treatments were not helpful for current symptoms: - Acetaminophen - Ibuprofen   Previous history of UTI. Current symptoms feel exactly the same as previous UTIs. Received treatment for UTI 1 to 3 times in last year. Patient's last use of antibiotics for UTI was over 1 month ago.   Does not remember which antibiotics they have taken for past UTIs.   No known history of yeast infections as a result of taking antibiotics for past UTIs.   No history of pyelonephritis. No history of kidney stones.   Had sexual intercourse in the past week. Does not use diaphragm. No unprotected sexual intercourse with a new partner in the last 2 weeks. Has not been exposed to sexually transmitted infections in the last month.   Patient is not being treated for diabetes mellitus.   Review of red flags/alarm symptoms:    No recent hospitalizations or nursing home care (last 3 months)    No history of renal failure    No recent history of urologic instrumentation    No anatomic abnormalities of the urinary tract    No abnormal vaginal discharge    No visible vaginal sores    No pain with sexual intercourse    No abnormal vaginal bleeding or spotting   Pregnancy/menstrual status/breastfeeding:   Patient is postmenopausal.   Current medications   Currently taking buPROPion  MG 24 hr tablet.   Medication allergies   None.   Medication contraindication review   Not taking ACE  inhibitors and ARBs.   No history of anaphylactic reaction to beta-lactams; folate deficiency; G6PD deficiency; arrhythmia; coronary artery disease; megaloblastic anemia; mononucleosis; myasthenia gravis; cholestatic jaundice; oliguria/anuria; and TMP/SMX-associated thrombocytopenia.   No known history of amoxicillin-clavulanate-associated cholestatic jaundice or nitrofurantoin-associated cholestatic jaundice.   Past medical history   Immune conditions: No immunocompromising conditions. No history of cancer.   Assessment   Uncomplicated acute UTI.   This is the likely diagnosis based on patient's symptoms and history, including:    Previous history of UTI    Current symptoms are exactly the same as previous UTIs    Moderate pelvic or abdominal pain that interferes with daily tasks    Urine described as cloudy with a strong odor    Recent history of delaying urination   Plan   Medications:    phenazopyridine 200 mg tablet RX 200mg 1 tab PO tid PRN 2d for pain or discomfort associated with your condition. Amount is 6 tab.    nitrofurantoin monohydrate/macrocrystals 100 mg capsule RX 100mg 1 cap PO q12h 5d for infection. This medication is an antibiotic. Take it exactly as directed. You must finish the entire course of medication, even if you feel better after taking the first few doses. Amount is 10 cap.   The patient's prescriptions will be sent to:   [facility]/pharmacy #[id number]   [address] [place] [address]   Phone: [phone]     Fax: [phone]   Education:    Condition and causes    Prevention    Treatment and self-care    When to call provider   Follow-up:   Patient to follow up as needed for progression or lack of improvement in symptoms within 3d.   ----------   Electronically signed by ABDIRIZAK Herring on 2022-11-09 at 09:38AM   ----------   Patient Interview Transcript:   Knowing about your anatomy is important for diagnosing and treating UTIs. The gender we have on file for you is female, but we  realize that this might not tell the whole story. Would you like to tell us more about your anatomy?    No   Not selected:    Yes   Which of these symptoms do you have? Select all that apply.    Pain or burning while urinating    Frequent urination    Sudden urge to urinate and it's hard to hold the urine in   How long have you had these symptoms? Select one.    1 to 3 days   Not selected:    Less than 24 hours    4 to 6 days    7 to 10 days    More than 10 days   Since your current symptoms started, has it been difficult to start, stop, or delay urination? Select one.    Yes   Not selected:    No   What color is your urine? Select one.    Cloudy   Not selected:    Clear    Yellow    Pink or red   Does your urine smell strange (like ammonia) or stronger than usual? Select one.    Yes   Not selected:    No   Do you also have any of these symptoms? Select all that apply.    Pain, pressure, or discomfort in the lower abdomen   Not selected:    Fever    Nausea    Vomiting    Back pain    No   How would you describe your lower abdominal pain, pressure, or discomfort? Select one.    Moderate; it's uncomfortable and gets in the way of doing daily tasks   Not selected:    Mild; I only notice it when I pay attention to it    Severe; I can't get comfortable, and it stops me from doing daily tasks   Do you have any flank pain? The flank is the side of the body between the ribs and the hips.    No   Not selected:    Yes, in my left flank    Yes, in my right flank    Yes, in both my left and right flanks   Do you have any of these vaginal symptoms? Select all that apply.    No   Not selected:    Abnormal vaginal itching    Unscheduled or abnormal vaginal bleeding or spotting    Pain during sex    Visible sores on the vagina    Abnormal vaginal discharge   In the past 2 weeks, have you had a medical device or instrument placed in your urinary tract? Examples include catheters, stents, and nephrostomy tubes. Select one.    No    Not selected:    Yes   Have you recently been hospitalized or been a resident of a nursing home or other long-term care facility? This doesn't include emergency room (ER) visits. Select one.    No   Not selected:    Yes, within the last 2 weeks    Yes, within the last 3 months   Have you ever had severe problems with your kidneys, such as kidney failure? Select one.    No   Not selected:    Yes   Kidney stones    No   Not selected:    Within the last year    More than a year ago   Kidney infection (pyelonephritis)    No   Not selected:    Within the last year    More than a year ago   Have you ever been diagnosed with any of these? Select all that apply.    No   Not selected:    Urinary reflux    Bladder diverticula    Single (or horseshoe) kidney    Duplicated urethra   Have you recently held your urine for a long time after you felt the urge to go? Select one.    Yes   Not selected:    No   Have you recently avoided eating or drinking so you wouldn't have the urge to urinate as often? Select one.    No   Not selected:    Yes   Do you use a diaphragm? Select one.    No   Not selected:    Yes   Have you gone through menopause? Select one.    Yes   Not selected:    No    I'm going through it now   Have you had sexual intercourse in the past week? Recent sexual intercourse is a risk factor for urinary tract infections. Select one.    Yes   Not selected:    No   Have you been exposed to a sexually transmitted infection (STI or STD) in the last month? Examples include chlamydia, gonorrhea, trichomoniasis, and herpes. Select one.    No, not that I know of   Not selected:    Yes   Have you had unprotected sexual intercourse with a new partner in the last 2 weeks? Select one.    No   Not selected:    Yes   Have you traveled to any of these countries within the last 3 months? Recent travel to these countries may affect which medication we recommend for your symptoms. Select all that apply.    None of these   Not selected:     [facility]    [place]    [place]   Acetaminophen (Tylenol)    Not helpful   Not selected:    Helpful   Ibuprofen (Advil, Motrin)    Not helpful   Not selected:    Helpful   Phenazopyridine (Azo, Baridium, Pyridium, Uricalm, Uristat)    Helpful   Not selected:    Not helpful   Have you ever had a urinary tract infection (UTI)? A UTI is often called a bladder infection or acute cystitis. Select one.    Yes   Not selected:    No, not that I know of   How much do your current symptoms feel like past UTIs? Select one.    Exactly the same   Not selected:    Mostly the same    Somewhat the same    Totally different   In the past year, how many times have you taken antibiotics for a UTI? Select one.    1 to 3   Not selected:    0    4 or more   When did you last take antibiotics for a UTI? Select one.    More than 1 month ago   Not selected:    Less than 1 month ago   Do you remember which antibiotics you took for UTIs in the past? Select one.    No   Not selected:    Yes   Have you ever developed a yeast infection as a result of taking antibiotics? Select one.    No, not that I know of   Not selected:    Yes   UTIs may be more serious when other factors are present. Let's address those now. Are you being treated for type 1 or type 2 diabetes? Select one.    No   Not selected:    Yes   Do you have any of these conditions that can affect the immune system? Scroll to see all options. Select all that apply.    None of these   Not selected:    History of bone marrow transplant    Chronic kidney disease    Chronic liver disease (including cirrhosis)    HIV/AIDS    Inflammatory bowel disease (Crohn's disease or ulcerative colitis)    Lupus    Moderate to severe plaque psoriasis    Multiple sclerosis    Rheumatoid arthritis    Sickle cell anemia    Alpha or beta thalassemia    History of solid organ transplant (kidney, liver, or heart)    History of spleen removal    An autoimmune disorder not listed here    A condition  requiring treatment with long-term use of oral steroids (such as prednisone, prednisolone, or dexamethasone)   Have you ever been diagnosed with cancer? Select one.    No   Not selected:    Yes, I have cancer now    Yes, but I'm in remission   These last few questions will help us create the right treatment plan for you. Are you being treated for any of these conditions? Select all that apply.    No   Not selected:    Bentley-Danlos syndrome    Folate deficiency    G6PD deficiency    High blood pressure    History of aortic aneurysm or dissection    Marfan syndrome    Megaloblastic anemia    Mono (mononucleosis)    Myasthenia gravis    Oliguria or anuria    Peripheral vascular disease   Have you ever had jaundice as a result of taking amoxicillin-clavulanate (Augmentin) or nitrofurantoin (Macrobid)? Select all that apply.    No   Not selected:    Yes, from amoxicillin-clavulanate (Augmentin)    Yes, from nitrofurantoin (Macrobid, Macrodantin)   Are you taking any of these medications? Select all that apply.    No   Not selected:    An ACE inhibitor such as lisinopril, enalapril, captopril, or benazepril    An angiotensin II receptor blocker (ARB) such as candesartan, irbesartan, losartan, or valsartan   Are you still taking these medications listed in your medical record? If you're not taking any of these, click Next. Select all that apply.    buPROPion  MG 24 hr tablet   Are you taking any other medications, vitamins, or supplements? Select one.    No   Not selected:    Yes   Have you ever had an allergic or bad reaction to any medication? Select one.    No   Not selected:    Yes   Do you need a doctor's note? A doctor's note confirms that you received care today and states when you can return to school or work. It does not contain information about your diagnosis or treatment plan. Your provider will make the final decision on whether to give you a doctor's note. Doctor's notes CANNOT be backdated. Select  one.    No   Not selected:    Today only (1 day)   Is there anything else you'd like to tell us about your symptoms?   The patient did not enter any additional information.   ----------   Medical history   The following information was received from the EMR on November 09, 2022.   Allergies:    No Known Allergies   - Allergy Type:   - Reaction:   - Severity:   - Clinical Status: Active   - Verification Status: Confirmed   Medications:    buPROPion (WELLBUTRIN XL) 24 hr tablet   - Route: Oral   - Start Date: July 14, 2018   - End Date: None   - Status: Active    ELDERBERRY PO   - Route: Oral   - Start Date: January 11, 2021   - End Date: None   - Status: Active    phentermine capsule   - Route: Oral   - Start Date: November 09, 2020   - End Date: None   - Status: Active   Problem list:    Encounter for screening for malignant neoplasm of colon   - Category: Problem List Item   - Health Status:   - Start Date: November 10, 2020   - End Date: None   - Status: Active

## 2022-11-10 ENCOUNTER — E-VISIT (OUTPATIENT)
Dept: FAMILY MEDICINE CLINIC | Facility: TELEHEALTH | Age: 52
End: 2022-11-10
Payer: COMMERCIAL

## 2022-11-10 PROCEDURE — BRIGHTMDVISIT: Performed by: NURSE PRACTITIONER

## 2022-11-10 NOTE — EXTERNAL PATIENT INSTRUCTIONS
View Doctor's Note     Diagnosis   Urinary tract infection (UTI)   My name is Saira Dubose. I'm a healthcare provider at [facility]. After reviewing your interview, I see you have a urinary tract infection (UTI).   I've given you a doctor's note for 1 day.   Medications   Your pharmacy   [facility]/pharmacy #[id number] [place] [address] [phone]     Prescription   Phenazopyridine (200mg): Take 1 tablet by mouth three times a day as needed for 2 days for pain or discomfort associated with your condition.   TMP/SMX (160mg/800mg): Take 1 tablet by mouth every 12 hours for 3 days for infection. This medication is an antibiotic. Take it exactly as directed. You must finish the entire course of medication, even if you feel better after taking the first few doses.    I've given you a prescription dose of phenazopyridine. If it's more affordable or convenient, you may use the equivalent amount of non-prescription phenazopyridine. For example, instead of taking one 200 mg phenazopyridine tablet, you may take two 95 mg phenazopyridine tablets.   About your diagnosis   A UTI is an infection of one or more parts of the urinary tract, most commonly the bladder.   Most UTIs are caused by bacteria (usually E. coli) that travel up the urethra and into the bladder. I see that you have some common signs and symptoms of a UTI:    Pain or burning while urinating    Frequent urination    Sudden urge to urinate    Symptoms that feel a lot like past UTIs    Cloudy urine    Strange or strong smelling urine    Symptoms that began shortly after sexual intercourse   Fortunately, most UTIs aren't serious, and they're easily treated with antibiotics. Make sure you take all of the antibiotic pills given to you, even if you start to feel better after the first few doses. Otherwise, the UTI might come back.   What to expect   If you follow this treatment plan, you should start to feel better within 1 to 2 days.   When to seek  care   Call us at 1 [phone]   with any sudden or unexpected symptoms.    Symptoms that don't improve or get worse in the next 48 hours    Fever that goes above 101F or lasts longer than 24 hours    Shaking or chills    Nausea or vomiting    Severe flank pain (pain in your back or side)   Other treatment    Rest and drink plenty of water    Urinate frequently and when you first feel the urge    Place a heating pad on your back or stomach to help relieve some of the discomfort   Prevention    Drink a lot of liquids to help flush bacteria from your system. Water is best. Try for six to eight, 8-ounce glasses a day on a regular basis.    Urinate often and when you first feel the urge. Bacteria can grow when urine stays in the bladder too long. Urinate after sex to flush away bacteria.    After using the toilet, always wipe from front to back. This step is most important after a bowel movement. Wiping from front to back prevents bacteria normally found in stool from entering the urinary tract.   Your provider   Your diagnosis was provided by Saira Dubose, a member of your trusted care team at [facility].   If you have any questions, call us at [phone]  .   View Doctor's Note     Expires on 12/10/22

## 2022-11-10 NOTE — E-VISIT TREATED
Chief Complaint: Bladder infection (UTI)   Patient introduction   Patient is 52-year-old female.   Patient has had dysuria, frequent urination, and urinary urgency for 1 to 3 days.   Urine is cloudy with a strong or pungent odor.   Patient-submitted comments Patient writes: I was given Macrobid yesterday and I had chills and fever all night and this morning. I have taken 2 doses. This happened to me in 2020 when I took Macrobid, but it was at the beginning of Covid, so that's what I thought caused the fever. I stopped taking the Macrobid in 2020 and today. Can you call in a different antibiotic for me please?.   Patient requests a 1-day excuse note.   General presentation   Patient has had a fever for less than 24 hours. Measured temperature of below 100.4F. No nausea or vomiting.   No stomach/pelvic pain.   No back pain.   No flank pain.   The following treatments were helpful for current symptoms:    Phenazopyridine   The following treatments were not helpful for current symptoms: - Acetaminophen   Previous history of UTI. Current symptoms feel exactly the same as previous UTIs. Received treatment for UTI 1 to 3 times in last year. Patient's last use of antibiotics for UTI was over 1 month ago.   The following antibiotics were helpful for past UTIs:    TMP/SMX   The following antibiotics were not helpful for past UTIs:    Nitrofurantoin monohydrate   No known history of yeast infections as a result of taking antibiotics for past UTIs.   No history of pyelonephritis. No history of kidney stones.   Had sexual intercourse in the past week. Does not use diaphragm. No unprotected sexual intercourse with a new partner in the last 2 weeks. Has not been exposed to sexually transmitted infections in the last month.   Patient is not being treated for diabetes mellitus.   Review of red flags/alarm symptoms:    No recent hospitalizations or nursing home care (last 3 months)    No history of renal failure    No recent  history of urologic instrumentation    No anatomic abnormalities of the urinary tract    No abnormal vaginal discharge    No visible vaginal sores    No pain with sexual intercourse    No abnormal vaginal bleeding or spotting   Pregnancy/menstrual status/breastfeeding:   Patient is postmenopausal.   Current medications   Currently taking buPROPion  MG 24 hr tablet.   Medication allergies    Nitrofurantoin   Medication contraindication review   Not taking ACE inhibitors and ARBs.   Risk factors for antibiotic resistance: Used trimethoprim or TMP/SMX within the last 3 months.   No history of anaphylactic reaction to beta-lactams; folate deficiency; G6PD deficiency; arrhythmia; coronary artery disease; megaloblastic anemia; mononucleosis; myasthenia gravis; cholestatic jaundice; oliguria/anuria; and TMP/SMX-associated thrombocytopenia.   No known history of amoxicillin-clavulanate-associated cholestatic jaundice or nitrofurantoin-associated cholestatic jaundice.   Past medical history   Immune conditions: No immunocompromising conditions. No history of cancer.   Assessment   Uncomplicated acute UTI.   This is the likely diagnosis based on patient's symptoms and history, including:    Previous history of UTI    Current symptoms are exactly the same as previous UTIs    Urine described as cloudy with a strong odor    Recent history of delaying urination   Plan   Medications:    phenazopyridine 200 mg tablet RX 200mg 1 tab PO tid PRN 2d for pain or discomfort associated with your condition. Amount is 6 tab.    sulfamethoxazole 800 mg-trimethoprim 160 mg tablet RX 160mg/800mg 1 tab PO q12h 3d for infection. This medication is an antibiotic. Take it exactly as directed. You must finish the entire course of medication, even if you feel better after taking the first few doses. Amount is 6 tab.   The patient's prescriptions will be sent to:   [facility]/pharmacy #[id number] [id number]   Phone: [phone]      Fax: [phone]   Other:   Patient was given an excuse note for 1 day.   Education:    Condition and causes    Prevention    Treatment and self-care    When to call provider   Follow-up:   Patient to follow up as needed for progression or lack of improvement in symptoms within 3d.   ----------   Electronically signed by ABDIRIZAK Umana on 2022-11-10 at 15:32PM   ----------   Patient Interview Transcript:   Knowing about your anatomy is important for diagnosing and treating UTIs. The gender we have on file for you is female, but we realize that this might not tell the whole story. Would you like to tell us more about your anatomy?    No   Not selected:    Yes   Which of these symptoms do you have? Select all that apply.    Pain or burning while urinating    Frequent urination    Sudden urge to urinate and it's hard to hold the urine in   How long have you had these symptoms? Select one.    1 to 3 days   Not selected:    Less than 24 hours    4 to 6 days    7 to 10 days    More than 10 days   Since your current symptoms started, has it been difficult to start, stop, or delay urination? Select one.    No   Not selected:    Yes   What color is your urine? Select one.    Cloudy   Not selected:    Clear    Yellow    Pink or red   Does your urine smell strange (like ammonia) or stronger than usual? Select one.    Yes   Not selected:    No   Do you also have any of these symptoms? Select all that apply.    Fever   Not selected:    Nausea    Vomiting    Pain, pressure, or discomfort in the lower abdomen    Back pain    No   How long have you had a fever? Select one.    Less than 24 hours   Not selected:    1 to 3 days    4 or more days    I don't have a fever right now   Have you checked your temperature with a thermometer? Select one.    Yes   Not selected:    No, but it felt mild (low) to my touch    No, but it felt really hot (high) to my touch   What was the reading on the thermometer? Select one.    Below  100.4F   Not selected:    From 100.4F to 101.5F    From 101.6F to 101.9F    From 102.0F to 103.0F    Above 103.0F   Do you have any flank pain? The flank is the side of the body between the ribs and the hips.    No   Not selected:    Yes, in my left flank    Yes, in my right flank    Yes, in both my left and right flanks   Do you have any of these vaginal symptoms? Select all that apply.    No   Not selected:    Abnormal vaginal itching    Unscheduled or abnormal vaginal bleeding or spotting    Pain during sex    Visible sores on the vagina    Abnormal vaginal discharge   In the past 2 weeks, have you had a medical device or instrument placed in your urinary tract? Examples include catheters, stents, and nephrostomy tubes. Select one.    No   Not selected:    Yes   Have you recently been hospitalized or been a resident of a nursing home or other long-term care facility? This doesn't include emergency room (ER) visits. Select one.    No   Not selected:    Yes, within the last 2 weeks    Yes, within the last 3 months   Have you ever had severe problems with your kidneys, such as kidney failure? Select one.    No   Not selected:    Yes   Kidney stones    No   Not selected:    Within the last year    More than a year ago   Kidney infection (pyelonephritis)    No   Not selected:    Within the last year    More than a year ago   Have you ever been diagnosed with any of these? Select all that apply.    No   Not selected:    Urinary reflux    Bladder diverticula    Single (or horseshoe) kidney    Duplicated urethra   Have you recently held your urine for a long time after you felt the urge to go? Select one.    Yes   Not selected:    No   Have you recently avoided eating or drinking so you wouldn't have the urge to urinate as often? Select one.    No   Not selected:    Yes   Do you use a diaphragm? Select one.    No   Not selected:    Yes   Have you gone through menopause? Select one.    Yes   Not selected:    No    I'm  going through it now   Have you had sexual intercourse in the past week? Recent sexual intercourse is a risk factor for urinary tract infections. Select one.    Yes   Not selected:    No   Have you been exposed to a sexually transmitted infection (STI or STD) in the last month? Examples include chlamydia, gonorrhea, trichomoniasis, and herpes. Select one.    No, not that I know of   Not selected:    Yes   Have you had unprotected sexual intercourse with a new partner in the last 2 weeks? Select one.    No   Not selected:    Yes   Have you traveled to any of these countries within the last 3 months? Recent travel to these countries may affect which medication we recommend for your symptoms. Select all that apply.    None of these   Not selected:    [place]    [place]    [place]    [place]   Acetaminophen (Tylenol)    Not helpful   Not selected:    Helpful   Phenazopyridine (Azo, Baridium, Pyridium, Uricalm, Uristat)    Helpful   Not selected:    Not helpful   Have you ever had a urinary tract infection (UTI)? A UTI is often called a bladder infection or acute cystitis. Select one.    Yes   Not selected:    No, not that I know of   How much do your current symptoms feel like past UTIs? Select one.    Exactly the same   Not selected:    Mostly the same    Somewhat the same    Totally different   In the past year, how many times have you taken antibiotics for a UTI? Select one.    1 to 3   Not selected:    0    4 or more   When did you last take antibiotics for a UTI? Select one.    More than 1 month ago   Not selected:    Less than 1 month ago   Do you remember which antibiotics you took for UTIs in the past? Select one.    Yes   Not selected:    No   Nitrofurantoin (Macrobid, Macrodantin)    Not helpful   Not selected:    Helpful   Sulfamethoxazole/trimethoprim, also known as TMP/SMX (Bactrim, Bactrim DS)    Helpful   Not selected:    Not helpful   You mentioned using trimethoprim (Primsol) or Sulfamethoxazole/trimethoprim,  also known as TMP/SMX (Bactrim or Bactrim DS), in the past. Have you used these antibiotics in the last 3 months? Select one.    Yes   Not selected:    No   Have you ever developed a yeast infection as a result of taking antibiotics? Select one.    No, not that I know of   Not selected:    Yes   UTIs may be more serious when other factors are present. Let's address those now. Are you being treated for type 1 or type 2 diabetes? Select one.    No   Not selected:    Yes   Do you have any of these conditions that can affect the immune system? Scroll to see all options. Select all that apply.    None of these   Not selected:    History of bone marrow transplant    Chronic kidney disease    Chronic liver disease (including cirrhosis)    HIV/AIDS    Inflammatory bowel disease (Crohn's disease or ulcerative colitis)    Lupus    Moderate to severe plaque psoriasis    Multiple sclerosis    Rheumatoid arthritis    Sickle cell anemia    Alpha or beta thalassemia    History of solid organ transplant (kidney, liver, or heart)    History of spleen removal    An autoimmune disorder not listed here    A condition requiring treatment with long-term use of oral steroids (such as prednisone, prednisolone, or dexamethasone)   Have you ever been diagnosed with cancer? Select one.    No   Not selected:    Yes, I have cancer now    Yes, but I'm in remission   These last few questions will help us create the right treatment plan for you. Are you being treated for any of these conditions? Select all that apply.    No   Not selected:    Bentley-Danlos syndrome    Folate deficiency    G6PD deficiency    High blood pressure    History of aortic aneurysm or dissection    Marfan syndrome    Megaloblastic anemia    Mono (mononucleosis)    Myasthenia gravis    Oliguria or anuria    Peripheral vascular disease   Have you ever had jaundice as a result of taking amoxicillin-clavulanate (Augmentin) or nitrofurantoin (Macrobid)? Select all that apply.    " No   Not selected:    Yes, from amoxicillin-clavulanate (Augmentin)    Yes, from nitrofurantoin (Macrobid, Macrodantin)   Are you taking any of these medications? Select all that apply.    No   Not selected:    An ACE inhibitor such as lisinopril, enalapril, captopril, or benazepril    An angiotensin II receptor blocker (ARB) such as candesartan, irbesartan, losartan, or valsartan   Are you still taking these medications listed in your medical record? If you're not taking any of these, click Next. Select all that apply.    buPROPion  MG 24 hr tablet   Are you taking any other medications, vitamins, or supplements? Select one.    No   Not selected:    Yes   Have you ever had an allergic or bad reaction to any medication? Select one.    Yes   Not selected:    No   Have you had an allergic or bad reaction to any of these medications? Scroll to see all options. Select all that apply.    Nitrofurantoin (brands include Furadantin, Macrobid, Macrodantin)   Not selected:    Any antibiotic starting with \"cef-,\" such as cefazolin, cefdinir, cefuroxime, ceftriaxone, ceftazidime, cefepime, or cephalexin (brands include Ancef, Ceftin, Fortaz, Keflex, Maxipime, and Rocephin)    Any antibiotic ending with \"-floxacin,\" such as ciprofloxacin, gemifloxacin, levofloxacin, moxifloxacin, or ofloxacin (brands include Cipro, Factive, Floxin, and Levaquin)    Any antibiotic ending with \"-cillin,\" such as penicillin, amoxicillin, ampicillin, dicloxacillin, nafcillin, or piperacillin (brands include Augmentin, Unasyn, and Zosyn)    Sulfamethoxazole-trimethoprim (brands include Bactrim, Septra) or any other sulfa drug    Fluconazole (brand name Diflucan), itraconazole, or terconazole    Trimethoprim (brand name Primsol)    No, not that I know of   Have you had an allergic or bad reaction to any of these medications? Select all that apply.    No   Not selected:    Acetaminophen (Tylenol)    Ibuprofen (Advil, Midol, Motrin)   "  Phenazopyridine (Azo, Baridium, Pyridium, Uricalm, Uristat)   Do you need a doctor's note? A doctor's note confirms that you received care today and states when you can return to school or work. It does not contain information about your diagnosis or treatment plan. Your provider will make the final decision on whether to give you a doctor's note. Doctor's notes CANNOT be backdated. Select one.    Today only (1 day)   Not selected:    No   Is there anything else you'd like to tell us about your symptoms?    I was given Macrobid yesterday and I had chills and fever all night and this morning. I have taken 2 doses. This happened to me in 2020 when I took Macrobid, but it was at the beginning of Covid, so that's what I thought caused the fever. I stopped taking the Macrobid in 2020 and today. Can you call in a different antibiotic for me please?   ----------   Medical history   The following information was received from the EMR on November 10, 2022.   Allergies:    No Known Allergies   - Allergy Type:   - Reaction:   - Severity:   - Clinical Status: Active   - Verification Status: Confirmed   Medications:    buPROPion (WELLBUTRIN XL) 24 hr tablet   - Route: Oral   - Start Date: July 14, 2018   - End Date: None   - Status: Active    ELDERBERRY PO   - Route: Oral   - Start Date: January 11, 2021   - End Date: None   - Status: Active    phentermine capsule   - Route: Oral   - Start Date: November 09, 2020   - End Date: None   - Status: Active   Problem list:    Encounter for screening for malignant neoplasm of colon   - Category: Problem List Item   - Health Status:   - Start Date: November 10, 2020   - End Date: None   - Status: Active

## 2023-01-22 ENCOUNTER — E-VISIT (OUTPATIENT)
Dept: FAMILY MEDICINE CLINIC | Facility: TELEHEALTH | Age: 53
End: 2023-01-22
Payer: COMMERCIAL

## 2023-01-22 PROCEDURE — BRIGHTMDVISIT: Performed by: NURSE PRACTITIONER

## 2023-01-22 NOTE — E-VISIT TREATED
Chief Complaint: Bladder infection (UTI)   Patient introduction   Patient is 52-year-old female.   Patient has had dysuria and frequent urination for less than 24 hours.   Urine is yellow with no unusual odor.   Patient-submitted comments Patient writes: I get a UTI every time I have intercourse even though I go to the bathroom immediately after and clean myself well..   Patient did not request an excuse note.   General presentation   Patient has not had a fever. Patient has nausea.   Moderate abdominal or pelvic pain.   No back pain.   No flank pain.   Vaginal symptoms include:    Pain during sex   The following treatments were not helpful for current symptoms:    Phenazopyridine   Previous history of UTI. Current symptoms feel exactly the same as previous UTIs. Received treatment for UTI 1 to 3 times in last year. Patient's last use of antibiotics for UTI was over 1 month ago.   The following antibiotics were helpful for past UTIs:    TMP/SMX   The following antibiotics were not helpful for past UTIs:    Nitrofurantoin monohydrate   No known history of yeast infections as a result of taking antibiotics for past UTIs.   No history of pyelonephritis. No history of kidney stones.   Had sexual intercourse in the past week. Does not use diaphragm. No unprotected sexual intercourse with a new partner in the last 2 weeks. Has not been exposed to sexually transmitted infections in the last month.   Patient is not being treated for diabetes mellitus.   Review of red flags/alarm symptoms:    No recent hospitalizations or nursing home care (last 3 months)    No history of renal failure    No recent history of urologic instrumentation    No anatomic abnormalities of the urinary tract    No abnormal vaginal discharge    No visible vaginal sores    No abnormal vaginal bleeding or spotting   Pregnancy/menstrual status/breastfeeding:   Patient is postmenopausal.   Current medications   Currently taking buPROPion  MG 24  hr tablet.   Medication allergies    Nitrofurantoin   Medication contraindication review   Not taking ACE inhibitors and ARBs.   Risk factors for antibiotic resistance: Used trimethoprim or TMP/SMX within the last 3 months.   No history of anaphylactic reaction to beta-lactams; folate deficiency; G6PD deficiency; arrhythmia; coronary artery disease; megaloblastic anemia; mononucleosis; myasthenia gravis; cholestatic jaundice; oliguria/anuria; and TMP/SMX-associated thrombocytopenia.   No known history of amoxicillin-clavulanate-associated cholestatic jaundice or nitrofurantoin-associated cholestatic jaundice.   Past medical history   Immune conditions: No immunocompromising conditions. No history of cancer.   Assessment   Uncomplicated acute UTI.   This is the likely diagnosis based on patient's symptoms and history, including:    Previous history of UTI    Current symptoms are exactly the same as previous UTIs    Moderate pelvic or abdominal pain that interferes with daily tasks   Plan   Medications:    sulfamethoxazole 800 mg-trimethoprim 160 mg tablet RX 160mg/800mg 1 tab PO q12h 3d for infection. This medication is an antibiotic. Take it exactly as directed. You must finish the entire course of medication, even if you feel better after taking the first few doses. Amount is 6 tab.   The patient's prescription will be sent to:   [id number]   [address] [facility] [address]   Phone: [phone]     Fax: [phone]   Education:    Condition and causes    Prevention    Treatment and self-care    When to call provider   Follow-up:   Patient to follow up as needed for progression or lack of improvement in symptoms within 3d.   ----------   Electronically signed by ABDIRIZAK Rea on 2023-01-22 at 11:40AM   ----------   Patient Interview Transcript:   Knowing about your anatomy is important for diagnosing and treating UTIs. The gender we have on file for you is female, but we realize that this  might not tell the whole story. Would you like to tell us more about your anatomy?    No   Not selected:    Yes   Which of these symptoms do you have? Select all that apply.    Pain or burning while urinating    Frequent urination   Not selected:    Sudden urge to urinate and it's hard to hold the urine in   How long have you had these symptoms? Select one.    Less than 24 hours   Not selected:    1 to 3 days    4 to 6 days    7 to 10 days    More than 10 days   Since your current symptoms started, has it been difficult to start, stop, or delay urination? Select one.    No   Not selected:    Yes   What color is your urine? Select one.    Yellow   Not selected:    Clear    Cloudy    Pink or red   Does your urine smell strange (like ammonia) or stronger than usual? Select one.    No   Not selected:    Yes   Do you also have any of these symptoms? Select all that apply.    Nausea    Pain, pressure, or discomfort in the lower abdomen   Not selected:    Fever    Vomiting    Back pain    No   How would you describe your lower abdominal pain, pressure, or discomfort? Select one.    Moderate; it's uncomfortable and gets in the way of doing daily tasks   Not selected:    Mild; I only notice it when I pay attention to it    Severe; I can't get comfortable, and it stops me from doing daily tasks   Do you have any flank pain? The flank is the side of the body between the ribs and the hips.    No   Not selected:    Yes, in my left flank    Yes, in my right flank    Yes, in both my left and right flanks   Do you have any of these vaginal symptoms? Select all that apply.    Pain during sex   Not selected:    Abnormal vaginal itching    Unscheduled or abnormal vaginal bleeding or spotting    Visible sores on the vagina    Abnormal vaginal discharge    No   In the past 2 weeks, have you had a medical device or instrument placed in your urinary tract? Examples include catheters, stents, and nephrostomy tubes. Select one.    No   Not  selected:    Yes   Have you recently been hospitalized or been a resident of a nursing home or other long-term care facility? This doesn't include emergency room (ER) visits. Select one.    No   Not selected:    Yes, within the last 2 weeks    Yes, within the last 3 months   Have you ever had severe problems with your kidneys, such as kidney failure? Select one.    No   Not selected:    Yes   Kidney stones    No   Not selected:    Within the last year    More than a year ago   Kidney infection (pyelonephritis)    No   Not selected:    Within the last year    More than a year ago   Have you ever been diagnosed with any of these? Select all that apply.    No   Not selected:    Urinary reflux    Bladder diverticula    Single (or horseshoe) kidney    Duplicated urethra   Have you recently held your urine for a long time after you felt the urge to go? Select one.    No   Not selected:    Yes   Have you recently avoided eating or drinking so you wouldn't have the urge to urinate as often? Select one.    No   Not selected:    Yes   Do you use a diaphragm? Select one.    No   Not selected:    Yes   Have you gone through menopause? Select one.    Yes   Not selected:    No    I'm going through it now   Have you had sexual intercourse in the past week? Recent sexual intercourse is a risk factor for urinary tract infections. Select one.    Yes   Not selected:    No   Have you been exposed to a sexually transmitted infection (STI or STD) in the last month? Examples include chlamydia, gonorrhea, trichomoniasis, and herpes. Select one.    No, not that I know of   Not selected:    Yes   Have you had unprotected sexual intercourse with a new partner in the last 2 weeks? Select one.    No   Not selected:    Yes   Have you traveled to any of these countries within the last 3 months? Recent travel to these countries may affect which medication we recommend for your symptoms. Select all that apply.    None of these   Not selected:     [facility]    [place]    [place]   Phenazopyridine (Azo, Baridium, Pyridium, Uricalm, Uristat)    Not helpful   Not selected:    Helpful   Have you ever had a urinary tract infection (UTI)? A UTI is often called a bladder infection or acute cystitis. Select one.    Yes   Not selected:    No, not that I know of   How much do your current symptoms feel like past UTIs? Select one.    Exactly the same   Not selected:    Mostly the same    Somewhat the same    Totally different   In the past year, how many times have you taken antibiotics for a UTI? Select one.    1 to 3   Not selected:    0    4 or more   When did you last take antibiotics for a UTI? Select one.    More than 1 month ago   Not selected:    Less than 1 month ago   Do you remember which antibiotics you took for UTIs in the past? Select one.    Yes   Not selected:    No   Nitrofurantoin (Macrobid, Macrodantin)    Not helpful   Not selected:    Helpful   Sulfamethoxazole/trimethoprim, also known as TMP/SMX (Bactrim, Bactrim DS)    Helpful   Not selected:    Not helpful   You mentioned using trimethoprim (Primsol) or Sulfamethoxazole/trimethoprim, also known as TMP/SMX (Bactrim or Bactrim DS), in the past. Have you used these antibiotics in the last 3 months? Select one.    Yes   Not selected:    No   Have you ever developed a yeast infection as a result of taking antibiotics? Select one.    No, not that I know of   Not selected:    Yes   UTIs may be more serious when other factors are present. Let's address those now. Are you being treated for type 1 or type 2 diabetes? Select one.    No   Not selected:    Yes   Do you have any of these conditions that can affect the immune system? Scroll to see all options. Select all that apply.    None of these   Not selected:    History of bone marrow transplant    Chronic kidney disease    Chronic liver disease (including cirrhosis)    HIV/AIDS    Inflammatory bowel disease (Crohn's disease or ulcerative colitis)     Lupus    Moderate to severe plaque psoriasis    Multiple sclerosis    Rheumatoid arthritis    Sickle cell anemia    Alpha or beta thalassemia    History of solid organ transplant (kidney, liver, or heart)    History of spleen removal    An autoimmune disorder not listed here    A condition requiring treatment with long-term use of oral steroids (such as prednisone, prednisolone, or dexamethasone)   Have you ever been diagnosed with cancer? Select one.    No   Not selected:    Yes, I have cancer now    Yes, but I'm in remission   These last few questions will help us create the right treatment plan for you. Are you being treated for any of these conditions? Select all that apply.    No   Not selected:    Bentley-Danlos syndrome    Folate deficiency    G6PD deficiency    High blood pressure    History of aortic aneurysm or dissection    Marfan syndrome    Megaloblastic anemia    Mono (mononucleosis)    Myasthenia gravis    Oliguria or anuria    Peripheral vascular disease   Have you ever had jaundice as a result of taking amoxicillin-clavulanate (Augmentin) or nitrofurantoin (Macrobid)? Select all that apply.    No   Not selected:    Yes, from amoxicillin-clavulanate (Augmentin)    Yes, from nitrofurantoin (Macrobid, Macrodantin)   Are you taking any of these medications? Select all that apply.    No   Not selected:    An ACE inhibitor such as lisinopril, enalapril, captopril, or benazepril    An angiotensin II receptor blocker (ARB) such as candesartan, irbesartan, losartan, or valsartan   Are you still taking these medications listed in your medical record? If you're not taking any of these, click Next. Select all that apply.    buPROPion  MG 24 hr tablet   Are you taking any other medications, vitamins, or supplements? Select one.    No   Not selected:    Yes   Have you ever had an allergic or bad reaction to any medication? Select one.    Yes   Not selected:    No   Have you had an allergic or bad reaction  "to any of these medications? Scroll to see all options. Select all that apply.    Nitrofurantoin (brands include Furadantin, Macrobid, Macrodantin)   Not selected:    Any antibiotic starting with \"cef-,\" such as cefazolin, cefdinir, cefuroxime, ceftriaxone, ceftazidime, cefepime, or cephalexin (brands include Ancef, Ceftin, Fortaz, Keflex, Maxipime, and Rocephin)    Any antibiotic ending with \"-floxacin,\" such as ciprofloxacin, gemifloxacin, levofloxacin, moxifloxacin, or ofloxacin (brands include Cipro, Factive, Floxin, and Levaquin)    Any antibiotic ending with \"-cillin,\" such as penicillin, amoxicillin, ampicillin, dicloxacillin, nafcillin, or piperacillin (brands include Augmentin, Unasyn, and Zosyn)    Sulfamethoxazole-trimethoprim (brands include Bactrim, Septra) or any other sulfa drug    Fluconazole (brand name Diflucan), itraconazole, or terconazole    Trimethoprim (brand name Primsol)    No, not that I know of   Have you had an allergic or bad reaction to any of these medications? Select all that apply.    No   Not selected:    Acetaminophen (Tylenol)    Ibuprofen (Advil, Midol, Motrin)    Phenazopyridine (Azo, Baridium, Pyridium, Uricalm, Uristat)   Do you need a doctor's note? A doctor's note confirms that you received care today and states when you can return to school or work. It does not contain information about your diagnosis or treatment plan. Your provider will make the final decision on whether to give you a doctor's note. Doctor's notes CANNOT be backdated. Select one.    No   Not selected:    Today only (1 day)   Is there anything else you'd like to tell us about your symptoms?    I get a UTI every time I have intercourse even though I go to the bathroom immediately after and clean myself well.   ----------   Medical history   Medical history data does not currently exist for this patient.    "

## 2023-01-22 NOTE — EXTERNAL PATIENT INSTRUCTIONS
Note   Drink plenty of water and avoid caffeine If symptoms do not improve in 3-5 days follow up with your primary care provider or urgent care   Diagnosis   Urinary tract infection (UTI)   My name is Heather Kim. I'm a healthcare provider at [facility]. After reviewing your interview, I see you have a urinary tract infection (UTI).   Medications   Your pharmacy   [facility][id number] [address] [facility] [address] [phone]     Prescription   TMP/SMX (160mg/800mg): Take 1 tablet by mouth every 12 hours for 3 days for infection. This medication is an antibiotic. Take it exactly as directed. You must finish the entire course of medication, even if you feel better after taking the first few doses.   About your diagnosis   A UTI is an infection of one or more parts of the urinary tract, most commonly the bladder.   Most UTIs are caused by bacteria (usually E. coli) that travel up the urethra and into the bladder. I see that you have some common signs and symptoms of a UTI:    Pain or burning while urinating    Frequent urination    Symptoms that feel a lot like past UTIs    Mild or moderate pain, pressure, or discomfort in your lower abdomen    Symptoms that began shortly after sexual intercourse   Fortunately, most UTIs aren't serious, and they're easily treated with antibiotics. Make sure you take all of the antibiotic pills given to you, even if you start to feel better after the first few doses. Otherwise, the UTI might come back.   What to expect   If you follow this treatment plan, you should start to feel better within 1 to 2 days.   When to seek care   Call us at [phone]   with any sudden or unexpected symptoms.    Symptoms that don't improve or get worse in the next 48 hours    Fever that goes above 101F or lasts longer than 24 hours    Shaking or chills    Nausea or vomiting    Severe flank pain (pain in your back or side)   Other treatment    Rest and drink plenty of water     Urinate frequently and when you first feel the urge    Place a heating pad on your back or stomach to help relieve some of the discomfort   Prevention    Drink a lot of liquids to help flush bacteria from your system. Water is best. Try for six to eight, 8-ounce glasses a day on a regular basis.    Urinate often and when you first feel the urge. Bacteria can grow when urine stays in the bladder too long. Urinate after sex to flush away bacteria.    After using the toilet, always wipe from front to back. This step is most important after a bowel movement. Wiping from front to back prevents bacteria normally found in stool from entering the urinary tract.   Your provider   Your diagnosis was provided by Heather Kim, a member of your trusted care team at [facility].   If you have any questions, call us at [phone]  .

## 2023-03-22 ENCOUNTER — E-VISIT (OUTPATIENT)
Dept: FAMILY MEDICINE CLINIC | Facility: TELEHEALTH | Age: 53
End: 2023-03-22
Payer: COMMERCIAL

## 2023-03-22 PROCEDURE — BRIGHTMDVISIT: Performed by: NURSE PRACTITIONER

## 2023-03-22 NOTE — E-VISIT TREATED
Chief Complaint: Bladder infection (UTI)   Patient introduction   Patient is 52-year-old female.   Patient has had frequent urination for 1 to 3 days.   Urine is cloudy with a strong or pungent odor.   Patient-submitted comments Patient writes: I seem to get a UTI after sex every time. I am always careful to use the bathroom after and clean myself, but that doesn't seem to prevent UTI. Is there anything I can take as a preventative for after sex?.   Patient did not request an excuse note.   General presentation   Patient has not had a fever. No nausea or vomiting.   Mild abdominal or pelvic pain.   Mild back pain.   No flank pain. Difficulty starting, stopping, or delaying urination.   Vaginal symptoms include:    Pain during sex   The following treatments were helpful for current symptoms:    Phenazopyridine   The following treatments were not helpful for current symptoms: - Acetaminophen - Ibuprofen   Previous history of UTI. Current symptoms feel exactly the same as previous UTIs. Received treatment for UTI 1 to 3 times in last year. Patient's last use of antibiotics for UTI was over 1 month ago.   The following antibiotics were helpful for past UTIs:    TMP/SMX   The following antibiotics were not helpful for past UTIs:    Nitrofurantoin monohydrate   No known history of yeast infections as a result of taking antibiotics for past UTIs.   No history of pyelonephritis. No history of kidney stones.   Had sexual intercourse in the past week. Does not use diaphragm. No unprotected sexual intercourse with a new partner in the last 2 weeks. Has not been exposed to sexually transmitted infections in the last month.   Patient is not being treated for diabetes mellitus.   Review of red flags/alarm symptoms:    No recent hospitalizations or nursing home care (last 3 months)    No history of renal failure    No recent history of urologic instrumentation    No anatomic abnormalities of the urinary tract    No abnormal  vaginal discharge    No visible vaginal sores    No abnormal vaginal bleeding or spotting   Pregnancy/menstrual status/breastfeeding:   Patient is postmenopausal.   Current medications   Currently taking buPROPion  MG 24 hr tablet and Escitalopram.   Medication allergies    Nitrofurantoin   Medication contraindication review   Not taking ACE inhibitors and ARBs.   Risk factors for antibiotic resistance: Used trimethoprim or TMP/SMX within the last 3 months.   No history of anaphylactic reaction to beta-lactams; folate deficiency; G6PD deficiency; arrhythmia; coronary artery disease; megaloblastic anemia; mononucleosis; myasthenia gravis; cholestatic jaundice; oliguria/anuria; and TMP/SMX-associated thrombocytopenia.   No known history of amoxicillin-clavulanate-associated cholestatic jaundice or nitrofurantoin-associated cholestatic jaundice.   Past medical history   Immune conditions: No immunocompromising conditions. No history of cancer.   Assessment   Uncomplicated acute UTI.   This is the likely diagnosis based on patient's symptoms and history, including:    Previous history of UTI    Current symptoms are exactly the same as previous UTIs    Mild pelvic or abdominal pain    Mild back pain    Urine described as cloudy with a strong odor   Plan   Medications:    sulfamethoxazole 800 mg-trimethoprim 160 mg tablet RX 160mg/800mg 1 tab PO q12h 3d for infection. This medication is an antibiotic. Take it exactly as directed. You must finish the entire course of medication, even if you feel better after taking the first few doses. Amount is 6 tab.    phenazopyridine 200 mg tablet RX 200mg 1 tab PO tid PRN 2d for pain or discomfort associated with your condition. Amount is 6 tab.   The patient's prescriptions will be sent to:   [facility] #[id number]   [address] [address]   Phone: [phone]     Fax: [phone]   Education:    Condition and causes    Prevention    Treatment and self-care    When  to call provider   Follow-up:   Patient to follow up as needed for progression or lack of improvement in symptoms within 3d.   ----------   Electronically signed by ABDIRIZAK Herring on 2023-03-22 at 11:48AM   ----------   Patient Interview Transcript:   Knowing about your anatomy is important for diagnosing and treating UTIs. The gender we have on file for you is female, but we realize that this might not tell the whole story. Would you like to tell us more about your anatomy?    No   Not selected:    Yes   Which of these symptoms do you have? Select all that apply.    Frequent urination   Not selected:    Pain or burning while urinating    Sudden urge to urinate and it's hard to hold the urine in   How long have you had these symptoms? Select one.    1 to 3 days   Not selected:    Less than 24 hours    4 to 6 days    7 to 10 days    More than 10 days   Since your current symptoms started, has it been difficult to start, stop, or delay urination? Select one.    Yes   Not selected:    No   What color is your urine? Select one.    Cloudy   Not selected:    Clear    Yellow    Pink or red   Does your urine smell strange (like ammonia) or stronger than usual? Select one.    Yes   Not selected:    No   Do you also have any of these symptoms? Select all that apply.    Pain, pressure, or discomfort in the lower abdomen    Back pain   Not selected:    Fever    Nausea    Vomiting    No   How would you describe your lower abdominal pain, pressure, or discomfort? Select one.    Mild; I only notice it when I pay attention to it   Not selected:    Moderate; it's uncomfortable and gets in the way of doing daily tasks    Severe; I can't get comfortable, and it stops me from doing daily tasks   How would you describe your back pain? Select one.    Mild, achy pain   Not selected:    Moderate pain that gets in the way of my daily tasks    Severe, sharp pain that keeps me from my daily tasks   Do you have any flank pain? The flank  is the side of the body between the ribs and the hips.    No   Not selected:    Yes, in my left flank    Yes, in my right flank    Yes, in both my left and right flanks   Do you have any of these vaginal symptoms? Select all that apply.    Pain during sex   Not selected:    Abnormal vaginal itching    Unscheduled or abnormal vaginal bleeding or spotting    Visible sores on the vagina    Abnormal vaginal discharge    No   In the past 2 weeks, have you had a medical device or instrument placed in your urinary tract? Examples include catheters, stents, and nephrostomy tubes. Select one.    No   Not selected:    Yes   Have you recently been hospitalized or been a resident of a nursing home or other long-term care facility? This doesn't include emergency room (ER) visits. Select one.    No   Not selected:    Yes, within the last 2 weeks    Yes, within the last 3 months   Have you ever had severe problems with your kidneys, such as kidney failure? Select one.    No   Not selected:    Yes   Kidney stones    No   Not selected:    Within the last year    More than a year ago   Kidney infection (pyelonephritis)    No   Not selected:    Within the last year    More than a year ago   Have you ever been diagnosed with any of these? Select all that apply.    No   Not selected:    Urinary reflux    Bladder diverticula    Single (or horseshoe) kidney    Duplicated urethra   Have you recently held your urine for a long time after you felt the urge to go? Select one.    No   Not selected:    Yes   Have you recently avoided eating or drinking so you wouldn't have the urge to urinate as often? Select one.    No   Not selected:    Yes   Do you use a diaphragm? Select one.    No   Not selected:    Yes   Have you gone through menopause? Select one.    Yes   Not selected:    No    I'm going through it now   Have you had sexual intercourse in the past week? Recent sexual intercourse is a risk factor for urinary tract infections. Select  one.    Yes   Not selected:    No   Have you been exposed to a sexually transmitted infection (STI or STD) in the last month? Examples include chlamydia, gonorrhea, trichomoniasis, and herpes. Select one.    No, not that I know of   Not selected:    Yes   Have you had unprotected sexual intercourse with a new partner in the last 2 weeks? Select one.    No   Not selected:    Yes   Have you traveled to any of these countries within the last 3 months? Recent travel to these countries may affect which medication we recommend for your symptoms. Select all that apply.    None of these   Not selected:    [place]    [place]    [place]    [place]   Acetaminophen (Tylenol)    Not helpful   Not selected:    Helpful   Ibuprofen (Advil, Motrin)    Not helpful   Not selected:    Helpful   Phenazopyridine (Azo, Baridium, Pyridium, Uricalm, Uristat)    Helpful   Not selected:    Not helpful   Have you ever had a urinary tract infection (UTI)? A UTI is often called a bladder infection or acute cystitis. Select one.    Yes   Not selected:    No, not that I know of   How much do your current symptoms feel like past UTIs? Select one.    Exactly the same   Not selected:    Mostly the same    Somewhat the same    Totally different   In the past year, how many times have you taken antibiotics for a UTI? Select one.    1 to 3   Not selected:    0    4 or more   When did you last take antibiotics for a UTI? Select one.    More than 1 month ago   Not selected:    Less than 1 month ago   Do you remember which antibiotics you took for UTIs in the past? Select one.    Yes   Not selected:    No   Nitrofurantoin (Macrobid, Macrodantin)    Not helpful   Not selected:    Helpful   Sulfamethoxazole/trimethoprim, also known as TMP/SMX (Bactrim, Bactrim DS)    Helpful   Not selected:    Not helpful   You mentioned using trimethoprim (Primsol) or Sulfamethoxazole/trimethoprim, also known as TMP/SMX (Bactrim or Bactrim DS), in the past. Have you used these  antibiotics in the last 3 months? Select one.    Yes   Not selected:    No   Have you ever developed a yeast infection as a result of taking antibiotics? Select one.    No, not that I know of   Not selected:    Yes   UTIs may be more serious when other factors are present. Let's address those now. Are you being treated for type 1 or type 2 diabetes? Select one.    No   Not selected:    Yes   Do you have any of these conditions that can affect the immune system? Scroll to see all options. Select all that apply.    None of these   Not selected:    History of bone marrow transplant    Chronic kidney disease    Chronic liver disease (including cirrhosis)    HIV/AIDS    Inflammatory bowel disease (Crohn's disease or ulcerative colitis)    Lupus    Moderate to severe plaque psoriasis    Multiple sclerosis    Rheumatoid arthritis    Sickle cell anemia    Alpha or beta thalassemia    History of solid organ transplant (kidney, liver, or heart)    History of spleen removal    An autoimmune disorder not listed here    A condition requiring treatment with long-term use of oral steroids (such as prednisone, prednisolone, or dexamethasone)   Have you ever been diagnosed with cancer? Select one.    No   Not selected:    Yes, I have cancer now    Yes, but I'm in remission   These last few questions will help us create the right treatment plan for you. Are you being treated for any of these conditions? Select all that apply.    No   Not selected:    Bentley-Danlos syndrome    Folate deficiency    G6PD deficiency    High blood pressure    History of aortic aneurysm or dissection    Marfan syndrome    Megaloblastic anemia    Mono (mononucleosis)    Myasthenia gravis    Oliguria or anuria    Peripheral vascular disease   Have you ever had jaundice as a result of taking amoxicillin-clavulanate (Augmentin) or nitrofurantoin (Macrobid)? Select all that apply.    No   Not selected:    Yes, from amoxicillin-clavulanate (Augmentin)    Yes,  "from nitrofurantoin (Macrobid, Macrodantin)   Are you taking any of these medications? Select all that apply.    No   Not selected:    An ACE inhibitor such as lisinopril, enalapril, captopril, or benazepril    An angiotensin II receptor blocker (ARB) such as candesartan, irbesartan, losartan, or valsartan   Are you still taking these medications listed in your medical record? If you're not taking any of these, click Next. Select all that apply.    buPROPion  MG 24 hr tablet   Are you taking any other medications, vitamins, or supplements? Select one.    Yes   Not selected:    No   Have you ever had an allergic or bad reaction to any medication? Select one.    Yes   Not selected:    No   Have you had an allergic or bad reaction to any of these medications? Scroll to see all options. Select all that apply.    Nitrofurantoin (brands include Furadantin, Macrobid, Macrodantin)   Not selected:    Any antibiotic starting with \"cef-,\" such as cefazolin, cefdinir, cefuroxime, ceftriaxone, ceftazidime, cefepime, or cephalexin (brands include Ancef, Ceftin, Fortaz, Keflex, Maxipime, and Rocephin)    Any antibiotic ending with \"-floxacin,\" such as ciprofloxacin, gemifloxacin, levofloxacin, moxifloxacin, or ofloxacin (brands include Cipro, Factive, Floxin, and Levaquin)    Any antibiotic ending with \"-cillin,\" such as penicillin, amoxicillin, ampicillin, dicloxacillin, nafcillin, or piperacillin (brands include Augmentin, Unasyn, and Zosyn)    Sulfamethoxazole-trimethoprim (brands include Bactrim, Septra) or any other sulfa drug    Fluconazole (brand name Diflucan), itraconazole, or terconazole    Trimethoprim (brand name Primsol)    No, not that I know of   Have you had an allergic or bad reaction to any of these medications? Select all that apply.    No   Not selected:    Acetaminophen (Tylenol)    Ibuprofen (Advil, Midol, Motrin)    Phenazopyridine (Azo, Baridium, Pyridium, Uricalm, Uristat)   Do you need a doctor's " note? A doctor's note confirms that you received care today and states when you can return to school or work. It does not contain information about your diagnosis or treatment plan. Your provider will make the final decision on whether to give you a doctor's note. Doctor's notes CANNOT be backdated. Select one.    No   Not selected:    Today only (1 day)    Today and tomorrow (2 days)    3 days   Is there anything else you'd like to tell us about your symptoms?    I seem to get a UTI after sex every time. I am always careful to use the bathroom after and clean myself, but that doesn't seem to prevent UTI. Is there anything I can take as a preventative for after sex?   ----------   Medical history   The following information was received from the EMR on March 22, 2023.   Allergies:    No Known Allergies   - Allergy Type:   - Reaction:   - Severity:   - Clinical Status: Active   - Verification Status: Confirmed   Medications:    buPROPion (WELLBUTRIN XL) 24 hr tablet   - Route: Oral   - Start Date: July 14, 2018   - End Date: None   - Status: Active    ELDERBERRY PO   - Route: Oral   - Start Date: January 11, 2021   - End Date: None   - Status: Active    phentermine capsule   - Route: Oral   - Start Date: November 09, 2020   - End Date: None   - Status: Active   Problem list:    Encounter for screening for malignant neoplasm of colon   - Category: Problem List Item   - Health Status:   - Start Date: November 10, 2020   - End Date: None   - Status: Active

## 2023-03-22 NOTE — EXTERNAL PATIENT INSTRUCTIONS
Note   You may try Vaginal/Urinary probiotics and/or AZO cranberry tablets for prevention. If problems persist, I recommend you reach out to your PCP or Gynecologist to discuss.   Diagnosis   Urinary tract infection (UTI)   My name is Leah Ford. I'm a healthcare provider at [facility]. After reviewing your interview, I see you have a urinary tract infection (UTI).   Medications   Your pharmacy   [facility]/pharmacy #[id number] [address] [facility] [address] [phone]     Prescription   TMP/SMX (160mg/800mg): Take 1 tablet by mouth every 12 hours for 3 days for infection. This medication is an antibiotic. Take it exactly as directed. You must finish the entire course of medication, even if you feel better after taking the first few doses.   Phenazopyridine (200mg): Take 1 tablet by mouth three times a day as needed for 2 days for pain or discomfort associated with your condition.    I've given you a prescription dose of phenazopyridine. If it's more affordable or convenient, you may use the equivalent amount of non-prescription phenazopyridine. For example, instead of taking one 200 mg phenazopyridine tablet, you may take two 95 mg phenazopyridine tablets.   About your diagnosis   A UTI is an infection of one or more parts of the urinary tract, most commonly the bladder.   Most UTIs are caused by bacteria (usually E. coli) that travel up the urethra and into the bladder. I see that you have some common signs and symptoms of a UTI:    Frequent urination    Symptoms that feel a lot like past UTIs    Mild or moderate pain, pressure, or discomfort in your lower abdomen    Mild back pain    Cloudy urine    Strange or strong smelling urine    Symptoms that began shortly after sexual intercourse   Fortunately, most UTIs aren't serious, and they're easily treated with antibiotics. Make sure you take all of the antibiotic pills given to you, even if you start to feel better after the first few doses. Otherwise, the UTI  might come back.   What to expect   If you follow this treatment plan, you should start to feel better within 1 to 2 days.   When to seek care   Call us at [phone]   with any sudden or unexpected symptoms.    Symptoms that don't improve or get worse in the next 48 hours    Fever that goes above 101F or lasts longer than 24 hours    Shaking or chills    Nausea or vomiting    Severe flank pain (pain in your back or side) or pain that gets worse   Other treatment    Rest and drink plenty of water    Urinate frequently and when you first feel the urge    Place a heating pad on your back or stomach to help relieve some of the discomfort   Prevention    Drink a lot of liquids to help flush bacteria from your system. Water is best. Try for six to eight, 8-ounce glasses a day on a regular basis.    Urinate often and when you first feel the urge. Bacteria can grow when urine stays in the bladder too long. Urinate after sex to flush away bacteria.    After using the toilet, always wipe from front to back. This step is most important after a bowel movement. Wiping from front to back prevents bacteria normally found in stool from entering the urinary tract.   Your provider   Your diagnosis was provided by ABDIRIZAK Herring, a member of your trusted care team at [facility].   If you have any questions, call us at [phone]  .

## 2023-05-10 ENCOUNTER — E-VISIT (OUTPATIENT)
Dept: FAMILY MEDICINE CLINIC | Facility: TELEHEALTH | Age: 53
End: 2023-05-10

## 2023-05-10 ENCOUNTER — TELEMEDICINE (OUTPATIENT)
Dept: FAMILY MEDICINE CLINIC | Facility: TELEHEALTH | Age: 53
End: 2023-05-10

## 2023-05-10 DIAGNOSIS — R39.89 SUSPECTED UTI: Primary | ICD-10-CM

## 2023-05-10 PROCEDURE — 99213 OFFICE O/P EST LOW 20 MIN: CPT | Performed by: NURSE PRACTITIONER

## 2023-05-10 RX ORDER — CYCLOSPORINE 0.5 MG/ML
1 EMULSION OPHTHALMIC EVERY 12 HOURS
COMMUNITY
Start: 2023-04-24

## 2023-05-10 RX ORDER — SULFAMETHOXAZOLE AND TRIMETHOPRIM 800; 160 MG/1; MG/1
1 TABLET ORAL 2 TIMES DAILY
Qty: 10 TABLET | Refills: 0 | Status: SHIPPED | OUTPATIENT
Start: 2023-05-10 | End: 2023-05-15

## 2023-05-10 RX ORDER — BUPROPION HYDROCHLORIDE 300 MG/1
300 TABLET ORAL DAILY
COMMUNITY
Start: 2023-02-20

## 2023-05-10 NOTE — PATIENT INSTRUCTIONS
Drink plenty of water and avoid caffeine  If symptoms do not improve in 3-5 days follow up with your primary care provider or urgent care   Drink plenty of water before and after sexual intercourse and void immediately after,   Discuss taking an antibiotic after sex with your primary care provider.     Urinary Tract Infection, Adult  A urinary tract infection (UTI) is an infection of any part of the urinary tract. The urinary tract includes:  The kidneys.  The ureters.  The bladder.  The urethra.  These organs make, store, and get rid of pee (urine) in the body.  What are the causes?  This infection is caused by germs (bacteria) in your genital area. These germs grow and cause swelling (inflammation) of your urinary tract.  What increases the risk?  The following factors may make you more likely to develop this condition:  Using a small, thin tube (catheter) to drain pee.  Not being able to control when you pee or poop (incontinence).  Being female. If you are female, these things can increase the risk:  Using these methods to prevent pregnancy:  A medicine that kills sperm (spermicide).  A device that blocks sperm (diaphragm).  Having low levels of a female hormone (estrogen).  Being pregnant.  You are more likely to develop this condition if:  You have genes that add to your risk.  You are sexually active.  You take antibiotic medicines.  You have trouble peeing because of:  A prostate that is bigger than normal, if you are male.  A blockage in the part of your body that drains pee from the bladder.  A kidney stone.  A nerve condition that affects your bladder.  Not getting enough to drink.  Not peeing often enough.  You have other conditions, such as:  Diabetes.  A weak disease-fighting system (immune system).  Sickle cell disease.  Gout.  Injury of the spine.  What are the signs or symptoms?  Symptoms of this condition include:  Needing to pee right away.  Peeing small amounts often.  Pain or burning when  peeing.  Blood in the pee.  Pee that smells bad or not like normal.  Trouble peeing.  Pee that is cloudy.  Fluid coming from the vagina, if you are female.  Pain in the belly or lower back.  Other symptoms include:  Vomiting.  Not feeling hungry.  Feeling mixed up (confused). This may be the first symptom in older adults.  Being tired and grouchy (irritable).  A fever.  Watery poop (diarrhea).  How is this treated?  Taking antibiotic medicine.  Taking other medicines.  Drinking enough water.  In some cases, you may need to see a specialist.  Follow these instructions at home:  Medicines  Take over-the-counter and prescription medicines only as told by your doctor.  If you were prescribed an antibiotic medicine, take it as told by your doctor. Do not stop taking it even if you start to feel better.  General instructions  Make sure you:  Pee until your bladder is empty.  Do not hold pee for a long time.  Empty your bladder after sex.  Wipe from front to back after peeing or pooping if you are a female. Use each tissue one time when you wipe.  Drink enough fluid to keep your pee pale yellow.  Keep all follow-up visits.  Contact a doctor if:  You do not get better after 1-2 days.  Your symptoms go away and then come back.  Get help right away if:  You have very bad back pain.  You have very bad pain in your lower belly.  You have a fever.  You have chills.  You feeling like you will vomit or you vomit.  Summary  A urinary tract infection (UTI) is an infection of any part of the urinary tract.  This condition is caused by germs in your genital area.  There are many risk factors for a UTI.  Treatment includes antibiotic medicines.  Drink enough fluid to keep your pee pale yellow.  This information is not intended to replace advice given to you by your health care provider. Make sure you discuss any questions you have with your health care provider.  Document Revised: 07/30/2021 Document Reviewed: 07/30/2021  [employer]  Patient Education © 2022 [employer]

## 2023-05-10 NOTE — E-VISIT ESCALATED
Patient escalated   Provider ABDIRIZAK Bcukley chose to escalate patient to another level of care because: Patient's free text comments   Patient was sent the following message:   Based on the information you've provided us, you need to take another step to get care.   What to do now:   Setup a video visit   Please, schedule your video visit   Video visit     You won't be charged for your eVisit. If you paid with a credit card, the charge will be reversed.   Chief Complaint: Bladder infection (UTI)   Patient introduction   Patient is 52-year-old female.   Patient has had dysuria, frequent urination, and urinary urgency for 1 to 3 days.   Urine is cloudy with a strong or pungent odor.   Warning. The following may warrant further investigation:    Took TMP/SMX for UTI within the last 3 months    Treated for 4 or more UTIs in the past year   Patient-submitted comments Patient writes: I get a UTI every time I have intercourse. Is there anything I can take after this clears up to prevent that from happening?.   Patient did not request an excuse note.   General presentation   Patient has not had a fever. No nausea or vomiting.   Mild abdominal or pelvic pain.   No back pain.   No flank pain. Difficulty starting, stopping, or delaying urination.   Vaginal symptoms include:    Pain during sex   The following treatments were helpful for current symptoms:    Phenazopyridine   The following treatments were not helpful for current symptoms:    Acetaminophen    Ibuprofen   Previous history of UTI. Current symptoms feel exactly the same as previous UTIs. Received treatment for UTI 4 or more times in last year. Patient's last use of antibiotics for UTI was within the last 3 months.   The following antibiotic was helpful for their last UTI:    TMP/SMX   No known history of yeast infections as a result of taking antibiotics for past UTIs.   No history of pyelonephritis. No history of kidney stones.   Had sexual intercourse in the  past week. Does not use diaphragm. No unprotected sexual intercourse with a new partner in the last 2 weeks.   Patient is not being treated for diabetes mellitus.   Review of red flags/alarm symptoms:    No recent hospitalizations or nursing home care (last 3 months)    No history of renal failure    No recent history of urologic instrumentation    No anatomic abnormalities of the urinary tract    No abnormal vaginal discharge    No visible vaginal sores    No abnormal vaginal bleeding or spotting   Pregnancy/menstrual status/breastfeeding:   Patient is postmenopausal.   Current medications   Currently taking buPROPion  MG 24 hr tablet.   Medication allergies    Nitrofurantoin   Medication contraindication review   Not taking ACE inhibitors and ARBs.   Risk factors for antibiotic resistance: Used TMP/SMX within the last 3 months.   No history of Bentley-Danlos syndrome, folate deficiency, G6PD deficiency, high blood pressure, aortic aneurysm or dissection, Marfan syndrome, megaloblastic anemia, mononucleosis, myasthenia gravis, oliguria/anuria, or peripheral vascular disease.   No known history of amoxicillin-clavulanate-associated cholestatic jaundice or nitrofurantoin-associated cholestatic jaundice.   Past medical history   Immune conditions: No immunocompromising conditions.   No history of cancer.   Assessment:   Patient determined to need a level of care not appropriate to be delivered through eVisit.   Plan:   Patient informed of need to seek in-person care   ----------   Electronically signed by ABDIRIZAK Buckley on 2023-05-10 at 10:57AM   ----------   Patient Interview Transcript:   Knowing about your anatomy is important for diagnosing and treating UTIs. The gender we have on file for you is female, but we realize that this might not tell the whole story. Would you like to tell us more about your anatomy?    No   Not selected:    Yes   Which of these symptoms do you have? Select all that apply.     Pain or burning while urinating    Frequent urination    Sudden urge to urinate and it's hard to hold the urine in   How long have you had these symptoms? Select one.    1 to 3 days   Not selected:    Less than 24 hours    4 to 6 days    7 to 10 days    More than 10 days   Since your current symptoms started, has it been difficult to start, stop, or delay urination? Select one.    Yes   Not selected:    No   What color is your urine? Select one.    Cloudy   Not selected:    Clear    Yellow    Pink or red   Does your urine smell strange (like ammonia) or stronger than usual? Select one.    Yes   Not selected:    No   Do you also have any of these symptoms? Select all that apply.    Pain, pressure, or discomfort in the lower abdomen   Not selected:    Fever    Nausea    Vomiting    Back pain    No   How would you describe your lower abdominal pain, pressure, or discomfort? Select one.    Mild; I only notice it when I pay attention to it   Not selected:    Moderate; it's uncomfortable and gets in the way of doing daily tasks    Severe; I can't get comfortable, and it stops me from doing daily tasks   Do you have any flank pain? The flank is the side of the body between the ribs and the hips.    No   Not selected:    Yes, in my left flank    Yes, in my right flank    Yes, in both my left and right flanks   Do you have any of these vaginal symptoms? Select all that apply.    Pain during sex   Not selected:    Abnormal vaginal itching    Unscheduled or abnormal vaginal bleeding or spotting    Visible sores on the vagina    Abnormal vaginal discharge    No   In the past 2 weeks, have you had a medical device or instrument placed in your urinary tract? Examples include catheters, stents, and nephrostomy tubes. Select one.    No   Not selected:    Yes   Have you recently been hospitalized or been a resident of a nursing home or other long-term care facility? This doesn't include emergency room (ER) visits. Select one.     No   Not selected:    Yes, within the last 2 weeks    Yes, within the last 3 months   Have you ever had severe problems with your kidneys, such as kidney failure? Select one.    No, not that I recall   Not selected:    Yes   Kidney stones    No   Not selected:    Within the last year    More than a year ago   Kidney infection (pyelonephritis)    No   Not selected:    Within the last year    More than a year ago   Have you ever been diagnosed with any of these? Select all that apply.    No   Not selected:    Urinary reflux    Bladder diverticula    Single (or horseshoe) kidney    Duplicated urethra   Have you recently held your urine for a long time after you felt the urge to go? Select one.    Yes   Not selected:    No   Have you recently avoided eating or drinking so you wouldn't have the urge to urinate as often? Select one.    No   Not selected:    Yes   Do you use a diaphragm? Select one.    No   Not selected:    Yes   Have you gone through menopause? Select one.    Yes   Not selected:    No    I'm going through it now   Have you had sexual intercourse in the past week? Recent sexual intercourse is a risk factor for urinary tract infections. Select one.    Yes   Not selected:    No   Have you had unprotected sexual intercourse with a new partner in the last 2 weeks? Select one.    No   Not selected:    Yes   Have you traveled to any of these countries within the last 3 months? Recent travel to these countries may affect which medication we recommend for your symptoms. Select all that apply.    None of these   Not selected:    [place]    [place]    [place]    [place]   Acetaminophen (Tylenol)    Not helpful   Not selected:    Helpful   Ibuprofen (Advil, Motrin)    Not helpful   Not selected:    Helpful   Phenazopyridine (Azo, Baridium, Pyridium, Uricalm, Uristat)    Helpful   Not selected:    Not helpful   Have you ever had a urinary tract infection (UTI)? A UTI is often called a bladder infection or acute cystitis.  Select one.    Yes   Not selected:    No, not that I know of   How much do your current symptoms feel like past UTIs? Select one.    Exactly the same   Not selected:    Mostly the same    Somewhat the same    Totally different   In the past year, how many times have you taken antibiotics for a UTI? Select one.    4 or more   Not selected:    0    1 to 3   When did you last take antibiotics for a UTI? Select one.    Within the last 3 months   Not selected:    More than 3 months ago   Do you remember which antibiotic you took for your last UTI? Knowing which antibiotic you took helps your provider choose the best treatment for you. Select one.    Yes   Not selected:    No   Sulfamethoxazole/trimethoprim, also known as TMP/SMX (Bactrim, Bactrim DS)    Helpful   Not selected:    Not helpful   Have you ever developed a yeast infection as a result of taking antibiotics? Select one.    No, not that I know of   Not selected:    Yes   UTIs may be more serious when other factors are present. Let's address those now. Are you being treated for type 1 or type 2 diabetes? Select one.    No   Not selected:    Yes   Do you have any of these conditions that can affect the immune system? Scroll to see all options. Select all that apply.    None of these   Not selected:    History of bone marrow transplant    Chronic kidney disease    Chronic liver disease (including cirrhosis)    HIV/AIDS    Inflammatory bowel disease (Crohn's disease or ulcerative colitis)    Lupus    Moderate to severe plaque psoriasis    Multiple sclerosis    Rheumatoid arthritis    Sickle cell anemia    Alpha or beta thalassemia    History of solid organ transplant (kidney, liver, or heart)    History of spleen removal    An autoimmune disorder not listed here (specify)    A condition requiring treatment with long-term use of oral steroids (such as prednisone, prednisolone, or dexamethasone) (specify)   Have you ever been diagnosed with cancer? Select one.    " No   Not selected:    Yes, I have cancer now    Yes, but I'm in remission   These last few questions will help us create the right treatment plan for you. Are you being treated for any of these conditions? Select all that apply.    No   Not selected:    Bentley-Danlos syndrome    Folate deficiency    G6PD deficiency    High blood pressure    History of aortic aneurysm or dissection    Marfan syndrome    Megaloblastic anemia    Mono (mononucleosis)    Myasthenia gravis    Oliguria or anuria    Peripheral vascular disease   Have you ever had jaundice as a result of taking amoxicillin-clavulanate (Augmentin) or nitrofurantoin (Macrobid)? Select all that apply.    No   Not selected:    Yes, from amoxicillin-clavulanate (Augmentin)    Yes, from nitrofurantoin (Macrobid, Macrodantin)   Are you taking any of these medications? Select all that apply.    No   Not selected:    An ACE inhibitor such as lisinopril, enalapril, captopril, or benazepril    An angiotensin II receptor blocker (ARB) such as candesartan, irbesartan, losartan, or valsartan   Are you still taking these medications listed in your medical record? If you're not taking any of these, click Next. Select all that apply.    buPROPion  MG 24 hr tablet   Are you taking any other medications, vitamins, or supplements? Select one.    No   Not selected:    Yes   Have you ever had an allergic or bad reaction to any medication? Select one.    Yes   Not selected:    No   Have you had an allergic or bad reaction to any of these medications? Select all that apply.    Nitrofurantoin (brands include Furadantin, Macrobid and Macrodantin)   Not selected:    Any antibiotic starting with \"cef-,\" such as cefazolin, cefdinir, cefuroxime, ceftriaxone, ceftazidime, cefepime, or cephalexin (brands include Fortaz and Keflex)    Any antibiotic ending with \"-floxacin,\" such as ciprofloxacin, gemifloxacin, levofloxacin, moxifloxacin, or ofloxacin (brands include Cipro, Factive, " "Floxin, and Levaquin)    Any antibiotic ending with \"-cillin,\" such as penicillin, amoxicillin, ampicillin, dicloxacillin, nafcillin, or piperacillin (brands include Augmentin, Unasyn, and Zosyn)    Sulfamethoxazole-trimethoprim (brands include Bactrim and Septra) or any other sulfa drug    Fluconazole (brand name Diflucan), itraconazole, or terconazole    Trimethoprim (brand name Primsol)    No, not that I know of   Have you had an allergic or bad reaction to any of these medications? Select all that apply.    No   Not selected:    Acetaminophen (Tylenol)    Ibuprofen (Advil, Midol, Motrin)    Phenazopyridine (Azo, Baridium, Pyridium, Uricalm, Uristat)   Do you need a doctor's note? A doctor's note confirms that you received care today and states when you can return to school or work. It does not contain information about your diagnosis or treatment plan. Your provider will make the final decision on whether to give you a doctor's note. Doctor's notes CANNOT be backdated. Select one.    No   Not selected:    Today only (1 day)    Today and tomorrow (2 days)    3 days   Is there anything else you'd like to tell us about your symptoms?    I get a UTI every time I have intercourse. Is there anything I can take after this clears up to prevent that from happening?   ----------   Medical history   The following information was received from the EMR on May 10, 2023.   Allergies:    No Known Allergies   - Allergy Type:   - Reaction:   - Severity: None   - Clinical Status: Active   - Verification Status: Confirmed   Medications:    buPROPion (WELLBUTRIN XL) 24 hr tablet   - Route: Oral   - Start Date: July 14, 2018   - End Date: None   - Status: Active    ELDERBERRY PO   - Route: Oral   - Start Date: January 11, 2021   - End Date: None   - Status: Active    phentermine capsule   - Route: Oral   - Start Date: November 09, 2020   - End Date: None   - Status: Active   Problem list:    Encounter for screening for malignant " neoplasm of colon   - Category: Problem List Item   - Health Status:   - Start Date: November 10, 2020   - End Date: None   - Status: Active

## 2023-05-10 NOTE — PROGRESS NOTES
CHIEF COMPLAINT  Chief Complaint   Patient presents with    Urinary Tract Infection         HPI  Una Gonsalez is a 52 y.o. female  presents with complaint of symptoms of UTI for 2 days. She reports she has had them in the past and symptoms are similar. She has this often after having sexual intercourse. She has pyridium on hand, but has not taken this. She has increased her fluids.   She ask about UTI prevention after sex.     Review of Systems   Constitutional:  Negative for chills, fatigue and fever.   Gastrointestinal:  Negative for nausea and vomiting.   Genitourinary:  Positive for dysuria, frequency and urgency. Negative for decreased urine volume, difficulty urinating, dyspareunia, enuresis, flank pain, genital sores, hematuria, menstrual problem, pelvic pain, vaginal bleeding, vaginal discharge and vaginal pain.   Musculoskeletal:  Negative for back pain.     Past Medical History:   Diagnosis Date    Anxiety     Obesity        Family History   Problem Relation Age of Onset    Colon cancer Neg Hx     Colon polyps Neg Hx        Social History     Socioeconomic History    Marital status:    Tobacco Use    Smoking status: Never     Passive exposure: Never    Smokeless tobacco: Never   Vaping Use    Vaping Use: Never used   Substance and Sexual Activity    Alcohol use: No    Drug use: No    Sexual activity: Defer       Una Gonsalez  reports that she has never smoked. She has never been exposed to tobacco smoke. She has never used smokeless tobacco.      There were no vitals taken for this visit.    PHYSICAL EXAM  Physical Exam   Constitutional: She is oriented to person, place, and time. She appears well-developed and well-nourished. She does not have a sickly appearance. She does not appear ill. No distress.   HENT:   Head: Normocephalic and atraumatic.   Eyes: EOM are normal.   Neck: Neck normal appearance.  Pulmonary/Chest: Effort normal.  No respiratory distress.  Neurological: She is alert and  oriented to person, place, and time.   Skin: Skin is dry.   Psychiatric: She has a normal mood and affect.         Diagnoses and all orders for this visit:    1. Suspected UTI (Primary)    Other orders  -     sulfamethoxazole-trimethoprim (Bactrim DS) 800-160 MG per tablet; Take 1 tablet by mouth 2 (Two) Times a Day for 5 days.  Dispense: 10 tablet; Refill: 0        The use of a video visit has been reviewed with the patient and verbal informed consent has been obtained. Myself and Una Gonsalez participated in this visit. The patient is located in [place] [address]. I am located in [place], [place]. [employer] and [employer] were utilized.       Note Disclaimer: At [facility], we believe that sharing information builds trust and better   relationships. You are receiving this note because you recently visited [facility]. It is possible you   will see health information before a provider has talked with you about it. This kind of information can   be easy to misunderstand. To help you fully understand what it means for your health, we urge you to   discuss this note with your provider.    Heather Kim, ABDIRIZAK  05/10/2023  11:25 EDT

## 2024-10-04 ENCOUNTER — TRANSCRIBE ORDERS (OUTPATIENT)
Dept: ADMINISTRATIVE | Facility: HOSPITAL | Age: 54
End: 2024-10-04
Payer: COMMERCIAL

## 2024-10-04 DIAGNOSIS — Z12.31 ENCOUNTER FOR SCREENING MAMMOGRAM FOR MALIGNANT NEOPLASM OF BREAST: Primary | ICD-10-CM

## 2024-10-09 LAB
NCCN CRITERIA FLAG: NORMAL
TYRER CUZICK SCORE: 7

## 2024-11-11 ENCOUNTER — HOSPITAL ENCOUNTER (OUTPATIENT)
Dept: MAMMOGRAPHY | Facility: HOSPITAL | Age: 54
Discharge: HOME | End: 2024-11-11
Admitting: FAMILY MEDICINE
Payer: COMMERCIAL

## 2024-11-11 DIAGNOSIS — Z12.31 ENCOUNTER FOR SCREENING MAMMOGRAM FOR MALIGNANT NEOPLASM OF BREAST: ICD-10-CM

## 2024-11-11 PROCEDURE — 77063 BREAST TOMOSYNTHESIS BI: CPT

## 2024-11-11 PROCEDURE — 77067 SCR MAMMO BI INCL CAD: CPT

## 2025-07-07 ENCOUNTER — OFFICE VISIT (OUTPATIENT)
Age: 55
End: 2025-07-07
Payer: COMMERCIAL

## 2025-07-07 VITALS
HEIGHT: 60 IN | BODY MASS INDEX: 23.15 KG/M2 | DIASTOLIC BLOOD PRESSURE: 62 MMHG | SYSTOLIC BLOOD PRESSURE: 102 MMHG | WEIGHT: 117.9 LBS

## 2025-07-07 DIAGNOSIS — N94.9 VAGINAL LUMP: Primary | ICD-10-CM

## 2025-07-07 PROCEDURE — 99459 PELVIC EXAMINATION: CPT | Performed by: NURSE PRACTITIONER

## 2025-07-07 PROCEDURE — 99213 OFFICE O/P EST LOW 20 MIN: CPT | Performed by: NURSE PRACTITIONER

## 2025-07-07 NOTE — PROGRESS NOTES
Chief Complaint   Patient presents with    Mass     New pt. Pt went to her primary care dr last week and they saw a mass on the left side that looked like a marble. She denies any pain       History:  Una Gonsalez is a 54 y.o. female who presents today for evaluation of the above problems. She reports that 4-6 weeks ago her  noted a bump in her vaginal canal. He stated that it was quarter sized.   She mentioned it to her PCP a couple weeks ago. Her PCP noted it to be marble size.   Pt reports no pain r/t the bump. No abnormal discharge or bleeding.     Upon visual exam, her vaginal canal appeared intact with no abnormal lump, erythema, or discharge visualized. I was able to palpate a small cystic mass in her left vaginal wall. I noted it to be approximately the size of a pencil eraser.     The patient reports that it seems to have gotten smaller since she last noticed it. No signs of infection.   Will continue to follow the cyst and will see her again in 4 weeks.       She has had vaginal dryness and low libido. She is on a compound from [employer] that is managed by her PCP. PCP manages mammograms and annual lab work.   She had a hysterectomy in .          Allergies   Allergen Reactions    Macrobid [Nitrofurantoin] Other (See Comments)     Fever, chills, myalgia      Past Medical History:   Diagnosis Date    Anxiety     Obesity      Past Surgical History:   Procedure Laterality Date     SECTION  ,     COLONOSCOPY N/A 2021    Procedure: COLONOSCOPY WITH ANESTHESIA;  Surgeon: Kashif Tarango MD;  Location: [facility] ENDOSCOPY;  Service: Gastroenterology;  Laterality: N/A;  pre op: screening  post op:diverticulosis,   PCP: Carmen Sanchez, DO    EYE SURGERY      HYSTERECTOMY  2011     Family History   Problem Relation Age of Onset    Colon cancer Neg Hx     Colon polyps Neg Hx       reports that she has never smoked. She has never been exposed to tobacco smoke. She has never  "used smokeless tobacco. She reports that she does not drink alcohol and does not use drugs.      Current Outpatient Medications:     buPROPion XL (WELLBUTRIN XL) 300 MG 24 hr tablet, Take 1 tablet by mouth Daily. FOR DEPRESSION, Disp: , Rfl:     Restasis 0.05 % ophthalmic emulsion, Administer 1 drop to both eyes Every 12 (Twelve) Hours., Disp: , Rfl:     OBJECTIVE:  /62 (BP Location: Right arm, Patient Position: Sitting, Cuff Size: Adult)   Ht 152.4 cm (60\")   Wt 53.5 kg (117 lb 14.4 oz)   Breastfeeding No   BMI 23.03 kg/m²    Physical Exam  Exam conducted with a chaperone present (Indira VARELA MA).   Constitutional:       Appearance: Normal appearance.   Genitourinary:     General: Normal vulva.      Exam position: Lithotomy position.      Pubic Area: No rash or pubic lice.       Labia:         Right: No rash, tenderness, lesion or injury.         Left: No rash, tenderness, lesion or injury.       Comments: Cervix and uterus surgically absent. Vaginal cuff well-suspended.     Pencil eraser size cyst palpable in left vaginal wall. 6-7cm from introitus.     Neurological:      Mental Status: She is alert and oriented to person, place, and time.   Psychiatric:         Attention and Perception: Attention and perception normal.         Mood and Affect: Mood and affect normal.         Speech: Speech normal.         Behavior: Behavior normal. Behavior is cooperative.         Thought Content: Thought content normal.         Cognition and Memory: Cognition and memory normal.         Judgment: Judgment normal.       Assessment/Plan    Diagnoses and all orders for this visit:    1. Vaginal lump (Primary)         An After Visit Summary was printed and given to the patient at discharge.  No follow-ups on file.          Franchesca Guardado, APRN  "

## (undated) DEVICE — Device: Brand: DEFENDO AIR/WATER/SUCTION AND BIOPSY VALVE

## (undated) DEVICE — MASK,OXYGEN,MED CONC,ADLT,7' TUB, UC: Brand: PENDING

## (undated) DEVICE — THE CHANNEL CLEANING BRUSH IS A NYLON FLEXI BRUSH ATTACHED TO A FLEXIBLE PLASTIC SHEATH DESIGNED TO SAFELY REMOVE DEBRIS FROM FLEXIBLE ENDOSCOPES.

## (undated) DEVICE — YANKAUER,BULB TIP WITH VENT: Brand: ARGYLE

## (undated) DEVICE — TBG SMPL FLTR LINE NASL 02/C02 A/ BX/100

## (undated) DEVICE — SENSR O2 OXIMAX FNGR A/ 18IN NONSTR

## (undated) DEVICE — CUFF,BP,DISP,1 TUBE,ADULT,HP: Brand: MEDLINE